# Patient Record
Sex: MALE | Race: OTHER | NOT HISPANIC OR LATINO | URBAN - METROPOLITAN AREA
[De-identification: names, ages, dates, MRNs, and addresses within clinical notes are randomized per-mention and may not be internally consistent; named-entity substitution may affect disease eponyms.]

---

## 2023-02-20 ENCOUNTER — INPATIENT (INPATIENT)
Facility: HOSPITAL | Age: 78
LOS: 6 days | Discharge: HOME | DRG: 233 | End: 2023-02-27
Attending: THORACIC SURGERY (CARDIOTHORACIC VASCULAR SURGERY) | Admitting: STUDENT IN AN ORGANIZED HEALTH CARE EDUCATION/TRAINING PROGRAM
Payer: MEDICARE

## 2023-02-20 ENCOUNTER — TRANSCRIPTION ENCOUNTER (OUTPATIENT)
Age: 78
End: 2023-02-20

## 2023-02-20 VITALS
TEMPERATURE: 98 F | RESPIRATION RATE: 20 BRPM | HEART RATE: 73 BPM | WEIGHT: 176.15 LBS | DIASTOLIC BLOOD PRESSURE: 69 MMHG | OXYGEN SATURATION: 98 % | HEIGHT: 74 IN | SYSTOLIC BLOOD PRESSURE: 115 MMHG

## 2023-02-20 DIAGNOSIS — I21.4 NON-ST ELEVATION (NSTEMI) MYOCARDIAL INFARCTION: ICD-10-CM

## 2023-02-20 DIAGNOSIS — I10 ESSENTIAL (PRIMARY) HYPERTENSION: ICD-10-CM

## 2023-02-20 DIAGNOSIS — E78.5 HYPERLIPIDEMIA, UNSPECIFIED: ICD-10-CM

## 2023-02-20 DIAGNOSIS — I24.9 ACUTE ISCHEMIC HEART DISEASE, UNSPECIFIED: ICD-10-CM

## 2023-02-20 LAB
A1C WITH ESTIMATED AVERAGE GLUCOSE RESULT: 7.2 % — HIGH (ref 4–5.6)
ABO RH CONFIRMATION: SIGNIFICANT CHANGE UP
ALBUMIN SERPL ELPH-MCNC: 4.1 G/DL — SIGNIFICANT CHANGE UP (ref 3.3–5.2)
ALBUMIN SERPL ELPH-MCNC: 4.1 G/DL — SIGNIFICANT CHANGE UP (ref 3.3–5.2)
ALP SERPL-CCNC: 77 U/L — SIGNIFICANT CHANGE UP (ref 40–120)
ALP SERPL-CCNC: 82 U/L — SIGNIFICANT CHANGE UP (ref 40–120)
ALT FLD-CCNC: 21 U/L — SIGNIFICANT CHANGE UP
ALT FLD-CCNC: 24 U/L — SIGNIFICANT CHANGE UP
ANION GAP SERPL CALC-SCNC: 13 MMOL/L — SIGNIFICANT CHANGE UP (ref 5–17)
ANION GAP SERPL CALC-SCNC: 14 MMOL/L — SIGNIFICANT CHANGE UP (ref 5–17)
APPEARANCE UR: CLEAR — SIGNIFICANT CHANGE UP
APTT BLD: 29.2 SEC — SIGNIFICANT CHANGE UP (ref 27.5–35.5)
APTT BLD: 46.6 SEC — HIGH (ref 27.5–35.5)
APTT BLD: 59.1 SEC — HIGH (ref 27.5–35.5)
AST SERPL-CCNC: 49 U/L — HIGH
AST SERPL-CCNC: 56 U/L — HIGH
BACTERIA # UR AUTO: ABNORMAL
BASOPHILS # BLD AUTO: 0.05 K/UL — SIGNIFICANT CHANGE UP (ref 0–0.2)
BASOPHILS # BLD AUTO: 0.06 K/UL — SIGNIFICANT CHANGE UP (ref 0–0.2)
BASOPHILS NFR BLD AUTO: 0.6 % — SIGNIFICANT CHANGE UP (ref 0–2)
BASOPHILS NFR BLD AUTO: 0.6 % — SIGNIFICANT CHANGE UP (ref 0–2)
BILIRUB SERPL-MCNC: 0.3 MG/DL — LOW (ref 0.4–2)
BILIRUB SERPL-MCNC: 0.4 MG/DL — SIGNIFICANT CHANGE UP (ref 0.4–2)
BILIRUB UR-MCNC: NEGATIVE — SIGNIFICANT CHANGE UP
BLD GP AB SCN SERPL QL: SIGNIFICANT CHANGE UP
BUN SERPL-MCNC: 17.7 MG/DL — SIGNIFICANT CHANGE UP (ref 8–20)
BUN SERPL-MCNC: 21.4 MG/DL — HIGH (ref 8–20)
CALCIUM SERPL-MCNC: 9.5 MG/DL — SIGNIFICANT CHANGE UP (ref 8.4–10.5)
CALCIUM SERPL-MCNC: 9.6 MG/DL — SIGNIFICANT CHANGE UP (ref 8.4–10.5)
CHLORIDE SERPL-SCNC: 101 MMOL/L — SIGNIFICANT CHANGE UP (ref 96–108)
CHLORIDE SERPL-SCNC: 98 MMOL/L — SIGNIFICANT CHANGE UP (ref 96–108)
CHOLEST SERPL-MCNC: 114 MG/DL — SIGNIFICANT CHANGE UP
CK MB CFR SERPL CALC: 10.7 NG/ML — HIGH (ref 0–6.7)
CK SERPL-CCNC: 149 U/L — SIGNIFICANT CHANGE UP (ref 30–200)
CK SERPL-CCNC: 152 U/L — SIGNIFICANT CHANGE UP (ref 30–200)
CO2 SERPL-SCNC: 22 MMOL/L — SIGNIFICANT CHANGE UP (ref 22–29)
CO2 SERPL-SCNC: 25 MMOL/L — SIGNIFICANT CHANGE UP (ref 22–29)
COLOR SPEC: YELLOW — SIGNIFICANT CHANGE UP
CREAT SERPL-MCNC: 0.84 MG/DL — SIGNIFICANT CHANGE UP (ref 0.5–1.3)
CREAT SERPL-MCNC: 1.08 MG/DL — SIGNIFICANT CHANGE UP (ref 0.5–1.3)
DIFF PNL FLD: NEGATIVE — SIGNIFICANT CHANGE UP
EGFR: 71 ML/MIN/1.73M2 — SIGNIFICANT CHANGE UP
EGFR: 90 ML/MIN/1.73M2 — SIGNIFICANT CHANGE UP
EOSINOPHIL # BLD AUTO: 0.18 K/UL — SIGNIFICANT CHANGE UP (ref 0–0.5)
EOSINOPHIL # BLD AUTO: 0.19 K/UL — SIGNIFICANT CHANGE UP (ref 0–0.5)
EOSINOPHIL NFR BLD AUTO: 1.9 % — SIGNIFICANT CHANGE UP (ref 0–6)
EOSINOPHIL NFR BLD AUTO: 2.2 % — SIGNIFICANT CHANGE UP (ref 0–6)
EPI CELLS # UR: SIGNIFICANT CHANGE UP
ESTIMATED AVERAGE GLUCOSE: 160 MG/DL — HIGH (ref 68–114)
GLUCOSE BLDC GLUCOMTR-MCNC: 144 MG/DL — HIGH (ref 70–99)
GLUCOSE SERPL-MCNC: 131 MG/DL — HIGH (ref 70–99)
GLUCOSE SERPL-MCNC: 246 MG/DL — HIGH (ref 70–99)
GLUCOSE UR QL: 100 MG/DL
HCT VFR BLD CALC: 41.8 % — SIGNIFICANT CHANGE UP (ref 39–50)
HCT VFR BLD CALC: 42.5 % — SIGNIFICANT CHANGE UP (ref 39–50)
HCT VFR BLD CALC: 42.7 % — SIGNIFICANT CHANGE UP (ref 39–50)
HDLC SERPL-MCNC: 39 MG/DL — LOW
HGB BLD-MCNC: 14.1 G/DL — SIGNIFICANT CHANGE UP (ref 13–17)
HGB BLD-MCNC: 14.1 G/DL — SIGNIFICANT CHANGE UP (ref 13–17)
HGB BLD-MCNC: 14.4 G/DL — SIGNIFICANT CHANGE UP (ref 13–17)
IMM GRANULOCYTES NFR BLD AUTO: 0.5 % — SIGNIFICANT CHANGE UP (ref 0–0.9)
IMM GRANULOCYTES NFR BLD AUTO: 0.5 % — SIGNIFICANT CHANGE UP (ref 0–0.9)
INR BLD: 1.02 RATIO — SIGNIFICANT CHANGE UP (ref 0.88–1.16)
INR BLD: 1.02 RATIO — SIGNIFICANT CHANGE UP (ref 0.88–1.16)
KETONES UR-MCNC: ABNORMAL
LEUKOCYTE ESTERASE UR-ACNC: ABNORMAL
LIPID PNL WITH DIRECT LDL SERPL: 48 MG/DL — SIGNIFICANT CHANGE UP
LYMPHOCYTES # BLD AUTO: 1.59 K/UL — SIGNIFICANT CHANGE UP (ref 1–3.3)
LYMPHOCYTES # BLD AUTO: 1.82 K/UL — SIGNIFICANT CHANGE UP (ref 1–3.3)
LYMPHOCYTES # BLD AUTO: 15.5 % — SIGNIFICANT CHANGE UP (ref 13–44)
LYMPHOCYTES # BLD AUTO: 22 % — SIGNIFICANT CHANGE UP (ref 13–44)
MCHC RBC-ENTMCNC: 29.5 PG — SIGNIFICANT CHANGE UP (ref 27–34)
MCHC RBC-ENTMCNC: 29.8 PG — SIGNIFICANT CHANGE UP (ref 27–34)
MCHC RBC-ENTMCNC: 30.4 PG — SIGNIFICANT CHANGE UP (ref 27–34)
MCHC RBC-ENTMCNC: 33 GM/DL — SIGNIFICANT CHANGE UP (ref 32–36)
MCHC RBC-ENTMCNC: 33.7 GM/DL — SIGNIFICANT CHANGE UP (ref 32–36)
MCHC RBC-ENTMCNC: 33.9 GM/DL — SIGNIFICANT CHANGE UP (ref 32–36)
MCV RBC AUTO: 88.4 FL — SIGNIFICANT CHANGE UP (ref 80–100)
MCV RBC AUTO: 89.3 FL — SIGNIFICANT CHANGE UP (ref 80–100)
MCV RBC AUTO: 89.7 FL — SIGNIFICANT CHANGE UP (ref 80–100)
MONOCYTES # BLD AUTO: 0.66 K/UL — SIGNIFICANT CHANGE UP (ref 0–0.9)
MONOCYTES # BLD AUTO: 0.86 K/UL — SIGNIFICANT CHANGE UP (ref 0–0.9)
MONOCYTES NFR BLD AUTO: 8 % — SIGNIFICANT CHANGE UP (ref 2–14)
MONOCYTES NFR BLD AUTO: 8.4 % — SIGNIFICANT CHANGE UP (ref 2–14)
NEUTROPHILS # BLD AUTO: 5.52 K/UL — SIGNIFICANT CHANGE UP (ref 1.8–7.4)
NEUTROPHILS # BLD AUTO: 7.52 K/UL — HIGH (ref 1.8–7.4)
NEUTROPHILS NFR BLD AUTO: 66.7 % — SIGNIFICANT CHANGE UP (ref 43–77)
NEUTROPHILS NFR BLD AUTO: 73.1 % — SIGNIFICANT CHANGE UP (ref 43–77)
NITRITE UR-MCNC: POSITIVE
NON HDL CHOLESTEROL: 75 MG/DL — SIGNIFICANT CHANGE UP
NT-PROBNP SERPL-SCNC: 1834 PG/ML — HIGH (ref 0–300)
PA ADP PRP-ACNC: 213 PRU — SIGNIFICANT CHANGE UP (ref 180–376)
PH UR: 5 — SIGNIFICANT CHANGE UP (ref 5–8)
PLATELET # BLD AUTO: 147 K/UL — LOW (ref 150–400)
PLATELET # BLD AUTO: 154 K/UL — SIGNIFICANT CHANGE UP (ref 150–400)
PLATELET # BLD AUTO: 166 K/UL — SIGNIFICANT CHANGE UP (ref 150–400)
POTASSIUM SERPL-MCNC: 4 MMOL/L — SIGNIFICANT CHANGE UP (ref 3.5–5.3)
POTASSIUM SERPL-MCNC: 4.3 MMOL/L — SIGNIFICANT CHANGE UP (ref 3.5–5.3)
POTASSIUM SERPL-SCNC: 4 MMOL/L — SIGNIFICANT CHANGE UP (ref 3.5–5.3)
POTASSIUM SERPL-SCNC: 4.3 MMOL/L — SIGNIFICANT CHANGE UP (ref 3.5–5.3)
PREALB SERPL-MCNC: 22 MG/DL — SIGNIFICANT CHANGE UP (ref 18–38)
PROT SERPL-MCNC: 6.7 G/DL — SIGNIFICANT CHANGE UP (ref 6.6–8.7)
PROT SERPL-MCNC: 6.8 G/DL — SIGNIFICANT CHANGE UP (ref 6.6–8.7)
PROT UR-MCNC: NEGATIVE — SIGNIFICANT CHANGE UP
PROTHROM AB SERPL-ACNC: 11.8 SEC — SIGNIFICANT CHANGE UP (ref 10.5–13.4)
PROTHROM AB SERPL-ACNC: 11.8 SEC — SIGNIFICANT CHANGE UP (ref 10.5–13.4)
RBC # BLD: 4.73 M/UL — SIGNIFICANT CHANGE UP (ref 4.2–5.8)
RBC # BLD: 4.74 M/UL — SIGNIFICANT CHANGE UP (ref 4.2–5.8)
RBC # BLD: 4.78 M/UL — SIGNIFICANT CHANGE UP (ref 4.2–5.8)
RBC # FLD: 13.1 % — SIGNIFICANT CHANGE UP (ref 10.3–14.5)
RBC # FLD: 13.2 % — SIGNIFICANT CHANGE UP (ref 10.3–14.5)
RBC # FLD: 13.2 % — SIGNIFICANT CHANGE UP (ref 10.3–14.5)
RBC CASTS # UR COMP ASSIST: NEGATIVE /HPF — SIGNIFICANT CHANGE UP (ref 0–4)
SARS-COV-2 RNA SPEC QL NAA+PROBE: SIGNIFICANT CHANGE UP
SODIUM SERPL-SCNC: 136 MMOL/L — SIGNIFICANT CHANGE UP (ref 135–145)
SODIUM SERPL-SCNC: 137 MMOL/L — SIGNIFICANT CHANGE UP (ref 135–145)
SP GR SPEC: 1.01 — SIGNIFICANT CHANGE UP (ref 1.01–1.02)
TRIGL SERPL-MCNC: 134 MG/DL — SIGNIFICANT CHANGE UP
TROPONIN T SERPL-MCNC: 1.39 NG/ML — HIGH (ref 0–0.06)
TROPONIN T SERPL-MCNC: 1.5 NG/ML — HIGH (ref 0–0.06)
TROPONIN T SERPL-MCNC: 1.66 NG/ML — HIGH (ref 0–0.06)
TSH SERPL-MCNC: 1.45 UIU/ML — SIGNIFICANT CHANGE UP (ref 0.27–4.2)
UROBILINOGEN FLD QL: NEGATIVE — SIGNIFICANT CHANGE UP
WBC # BLD: 10.27 K/UL — SIGNIFICANT CHANGE UP (ref 3.8–10.5)
WBC # BLD: 8.27 K/UL — SIGNIFICANT CHANGE UP (ref 3.8–10.5)
WBC # BLD: 9.71 K/UL — SIGNIFICANT CHANGE UP (ref 3.8–10.5)
WBC # FLD AUTO: 10.27 K/UL — SIGNIFICANT CHANGE UP (ref 3.8–10.5)
WBC # FLD AUTO: 8.27 K/UL — SIGNIFICANT CHANGE UP (ref 3.8–10.5)
WBC # FLD AUTO: 9.71 K/UL — SIGNIFICANT CHANGE UP (ref 3.8–10.5)
WBC UR QL: SIGNIFICANT CHANGE UP /HPF (ref 0–5)

## 2023-02-20 PROCEDURE — 71045 X-RAY EXAM CHEST 1 VIEW: CPT | Mod: 26

## 2023-02-20 PROCEDURE — 93306 TTE W/DOPPLER COMPLETE: CPT | Mod: 26

## 2023-02-20 PROCEDURE — 0042T: CPT | Mod: MA

## 2023-02-20 PROCEDURE — 99223 1ST HOSP IP/OBS HIGH 75: CPT

## 2023-02-20 PROCEDURE — 99291 CRITICAL CARE FIRST HOUR: CPT

## 2023-02-20 PROCEDURE — 93880 EXTRACRANIAL BILAT STUDY: CPT | Mod: 26

## 2023-02-20 PROCEDURE — 70498 CT ANGIOGRAPHY NECK: CPT | Mod: 26,MA

## 2023-02-20 PROCEDURE — 93010 ELECTROCARDIOGRAM REPORT: CPT

## 2023-02-20 PROCEDURE — 70496 CT ANGIOGRAPHY HEAD: CPT | Mod: 26,MA

## 2023-02-20 RX ORDER — LANOLIN ALCOHOL/MO/W.PET/CERES
5 CREAM (GRAM) TOPICAL AT BEDTIME
Refills: 0 | Status: DISCONTINUED | OUTPATIENT
Start: 2023-02-20 | End: 2023-02-21

## 2023-02-20 RX ORDER — CHLORHEXIDINE GLUCONATE 213 G/1000ML
1 SOLUTION TOPICAL DAILY
Refills: 0 | Status: DISCONTINUED | OUTPATIENT
Start: 2023-02-20 | End: 2023-02-21

## 2023-02-20 RX ORDER — ATORVASTATIN CALCIUM 80 MG/1
40 TABLET, FILM COATED ORAL AT BEDTIME
Refills: 0 | Status: DISCONTINUED | OUTPATIENT
Start: 2023-02-20 | End: 2023-02-21

## 2023-02-20 RX ORDER — ASPIRIN/CALCIUM CARB/MAGNESIUM 324 MG
325 TABLET ORAL ONCE
Refills: 0 | Status: COMPLETED | OUTPATIENT
Start: 2023-02-20 | End: 2023-02-20

## 2023-02-20 RX ORDER — DEXTROSE 50 % IN WATER 50 %
25 SYRINGE (ML) INTRAVENOUS ONCE
Refills: 0 | Status: DISCONTINUED | OUTPATIENT
Start: 2023-02-20 | End: 2023-02-21

## 2023-02-20 RX ORDER — METOPROLOL TARTRATE 50 MG
25 TABLET ORAL EVERY 12 HOURS
Refills: 0 | Status: DISCONTINUED | OUTPATIENT
Start: 2023-02-20 | End: 2023-02-20

## 2023-02-20 RX ORDER — SODIUM CHLORIDE 9 MG/ML
3 INJECTION INTRAMUSCULAR; INTRAVENOUS; SUBCUTANEOUS EVERY 8 HOURS
Refills: 0 | Status: DISCONTINUED | OUTPATIENT
Start: 2023-02-20 | End: 2023-02-21

## 2023-02-20 RX ORDER — ASPIRIN/CALCIUM CARB/MAGNESIUM 324 MG
81 TABLET ORAL DAILY
Refills: 0 | Status: DISCONTINUED | OUTPATIENT
Start: 2023-02-20 | End: 2023-02-21

## 2023-02-20 RX ORDER — INSULIN LISPRO 100/ML
VIAL (ML) SUBCUTANEOUS
Refills: 0 | Status: DISCONTINUED | OUTPATIENT
Start: 2023-02-20 | End: 2023-02-21

## 2023-02-20 RX ORDER — HEPARIN SODIUM 5000 [USP'U]/ML
4700 INJECTION INTRAVENOUS; SUBCUTANEOUS ONCE
Refills: 0 | Status: COMPLETED | OUTPATIENT
Start: 2023-02-20 | End: 2023-02-20

## 2023-02-20 RX ORDER — CEFUROXIME AXETIL 250 MG
1500 TABLET ORAL ONCE
Refills: 0 | Status: DISCONTINUED | OUTPATIENT
Start: 2023-02-21 | End: 2023-02-21

## 2023-02-20 RX ORDER — DEXTROSE 50 % IN WATER 50 %
15 SYRINGE (ML) INTRAVENOUS ONCE
Refills: 0 | Status: DISCONTINUED | OUTPATIENT
Start: 2023-02-20 | End: 2023-02-21

## 2023-02-20 RX ORDER — GLUCAGON INJECTION, SOLUTION 0.5 MG/.1ML
1 INJECTION, SOLUTION SUBCUTANEOUS ONCE
Refills: 0 | Status: DISCONTINUED | OUTPATIENT
Start: 2023-02-20 | End: 2023-02-21

## 2023-02-20 RX ORDER — PANTOPRAZOLE SODIUM 20 MG/1
40 TABLET, DELAYED RELEASE ORAL
Refills: 0 | Status: DISCONTINUED | OUTPATIENT
Start: 2023-02-20 | End: 2023-02-21

## 2023-02-20 RX ORDER — SODIUM CHLORIDE 9 MG/ML
1000 INJECTION, SOLUTION INTRAVENOUS
Refills: 0 | Status: DISCONTINUED | OUTPATIENT
Start: 2023-02-20 | End: 2023-02-20

## 2023-02-20 RX ORDER — VANCOMYCIN HCL 1 G
1250 VIAL (EA) INTRAVENOUS ONCE
Refills: 0 | Status: DISCONTINUED | OUTPATIENT
Start: 2023-02-21 | End: 2023-02-21

## 2023-02-20 RX ORDER — HEPARIN SODIUM 5000 [USP'U]/ML
4700 INJECTION INTRAVENOUS; SUBCUTANEOUS EVERY 6 HOURS
Refills: 0 | Status: DISCONTINUED | OUTPATIENT
Start: 2023-02-20 | End: 2023-02-20

## 2023-02-20 RX ORDER — CHLORHEXIDINE GLUCONATE 213 G/1000ML
15 SOLUTION TOPICAL
Refills: 0 | Status: DISCONTINUED | OUTPATIENT
Start: 2023-02-20 | End: 2023-02-21

## 2023-02-20 RX ORDER — ALOGLIPTIN 12.5 MG/1
1 TABLET, FILM COATED ORAL
Qty: 0 | Refills: 0 | DISCHARGE

## 2023-02-20 RX ORDER — CLOPIDOGREL BISULFATE 75 MG/1
300 TABLET, FILM COATED ORAL ONCE
Refills: 0 | Status: COMPLETED | OUTPATIENT
Start: 2023-02-20 | End: 2023-02-20

## 2023-02-20 RX ORDER — METOPROLOL TARTRATE 50 MG
12.5 TABLET ORAL
Refills: 0 | Status: DISCONTINUED | OUTPATIENT
Start: 2023-02-21 | End: 2023-02-21

## 2023-02-20 RX ORDER — DEXTROSE 50 % IN WATER 50 %
12.5 SYRINGE (ML) INTRAVENOUS ONCE
Refills: 0 | Status: DISCONTINUED | OUTPATIENT
Start: 2023-02-20 | End: 2023-02-21

## 2023-02-20 RX ORDER — HEPARIN SODIUM 5000 [USP'U]/ML
INJECTION INTRAVENOUS; SUBCUTANEOUS
Qty: 25000 | Refills: 0 | Status: DISCONTINUED | OUTPATIENT
Start: 2023-02-20 | End: 2023-02-21

## 2023-02-20 RX ORDER — NITROGLYCERIN 6.5 MG
0.4 CAPSULE, EXTENDED RELEASE ORAL ONCE
Refills: 0 | Status: COMPLETED | OUTPATIENT
Start: 2023-02-20 | End: 2023-02-20

## 2023-02-20 RX ORDER — METFORMIN HYDROCHLORIDE 850 MG/1
2 TABLET ORAL
Qty: 0 | Refills: 0 | DISCHARGE

## 2023-02-20 RX ORDER — DULOXETINE HYDROCHLORIDE 30 MG/1
1 CAPSULE, DELAYED RELEASE ORAL
Qty: 0 | Refills: 0 | DISCHARGE

## 2023-02-20 RX ADMIN — SODIUM CHLORIDE 3 MILLILITER(S): 9 INJECTION INTRAMUSCULAR; INTRAVENOUS; SUBCUTANEOUS at 21:50

## 2023-02-20 RX ADMIN — Medication 5 MILLIGRAM(S): at 22:34

## 2023-02-20 RX ADMIN — Medication 0.4 MILLIGRAM(S): at 15:09

## 2023-02-20 RX ADMIN — PANTOPRAZOLE SODIUM 40 MILLIGRAM(S): 20 TABLET, DELAYED RELEASE ORAL at 18:40

## 2023-02-20 RX ADMIN — Medication 25 MILLIGRAM(S): at 18:40

## 2023-02-20 RX ADMIN — Medication 325 MILLIGRAM(S): at 14:20

## 2023-02-20 RX ADMIN — ATORVASTATIN CALCIUM 40 MILLIGRAM(S): 80 TABLET, FILM COATED ORAL at 22:35

## 2023-02-20 RX ADMIN — HEPARIN SODIUM 950 UNIT(S)/HR: 5000 INJECTION INTRAVENOUS; SUBCUTANEOUS at 18:47

## 2023-02-20 RX ADMIN — HEPARIN SODIUM 1100 UNIT(S)/HR: 5000 INJECTION INTRAVENOUS; SUBCUTANEOUS at 22:36

## 2023-02-20 RX ADMIN — CLOPIDOGREL BISULFATE 300 MILLIGRAM(S): 75 TABLET, FILM COATED ORAL at 15:09

## 2023-02-20 RX ADMIN — HEPARIN SODIUM 4700 UNIT(S): 5000 INJECTION INTRAVENOUS; SUBCUTANEOUS at 15:09

## 2023-02-20 RX ADMIN — HEPARIN SODIUM 950 UNIT(S)/HR: 5000 INJECTION INTRAVENOUS; SUBCUTANEOUS at 15:08

## 2023-02-20 NOTE — CONSULT NOTE ADULT - ASSESSMENT
elevated tropnin    repeat trop with ck  heparin gtt  stat ECHO  plavix  asa given  nitro sublingual     pt confortable in stretcher.  77 /o M HTN HLD DM presented to ED with c/o L arm numbness, abnormal movement, code stroke called in ED, CT head neg for acute infarct. Labs significant for elevated troponin 1.5. Pt endorses slight constant chest pressure. States over past month have noticed the pressure more when walking dog. EKG concerning for anterior infarct.    Elevated Troponin   repeat trop with ck  heparin gtt ordered  stat ECHO- prelim EF 30%, possible LV thrombus, apex hypokenetic   plavix 300mg given  asa given  nitro sublingual   lipid panel, a1c in am    Arm numbness  neurology-code stroke called   head ct neg for acute infarct, no hemorrhage      77 /o M HTN HLD DM presented to ED with c/o L arm numbness, abnormal movement, code stroke called in ED, CT head neg for acute infarct. Labs significant for elevated troponin 1.5. Pt endorses slight constant chest pressure. States over past month have noticed the pressure more when walking dog. EKG concerning for anterior infarct.    Elevated Troponin/ACS  repeat trop with ck  heparin gtt ordered  stat ECHO- prelim EF 30%, possible LV thrombus, apex hypokenetic   plavix 300mg given  asa given  nitro sublingual   lipid panel, a1c in am  urgent cath today- discussed with Dr. estrada    Arm numbness  neurology-code stroke called   head ct neg for acute infarct, no hemorrhage

## 2023-02-20 NOTE — CONSULT NOTE ADULT - SUBJECTIVE AND OBJECTIVE BOX
[facility]                                              CARDIOLOGY AT [place] [address]                                             Telephone: [phone]. Fax:[phone]                                                       CARDIOLOGY CONSULTATION NOTE                                                                                             History obtained by: Patient and medical record  Community Cardiologist: none   obtained: Yes [  ] No [  ]  Reason for Consultation:   Available out pt records reviewed: Yes [  ] No [  ]    Chief complaint:    Patient is a 77y old  Male who presents with a chief complaint of     HPI: 77 /o M HTN HLD DM presented to ED with c/o L arm numbness, abnormal movement, code stroke called in ED, CT head neg for acute infarct. Labs significant for elevated troponin 1.5. Pt endorses slight constant chest pressure. States over past month have noticed the pressure more when walking dog. EKG concerning for posterior infarct.       CARDIAC TESTING   ECHO:    STRESS:    CATH:     ELECTROPHYSIOLOGY:     PAST MEDICAL HISTORY  HTN (hypertension)    HLD (hyperlipidemia)    Post traumatic stress disorder (PTSD)        PAST SURGICAL HISTORY      SOCIAL HISTORY:  Denies smoking/alcohol/drugs  CIGARETTES:     ALCOHOL:  DRUGS:    FAMILY HISTORY:    Family History of Cardiovascular Disease:  Yes [  ] No [  ]  Coronary Artery Disease in first degree relative: Yes [  ] No [  ]  Sudden Cardiac Death in First degree relative: Yes [  ] No [  ]    HOME MEDICATIONS:      CURRENT CARDIAC MEDICATIONS:  nitroglycerin     SubLingual 0.4 milliGRAM(s) SubLingual once, Stop order after: 1 Doses      CURRENT OTHER MEDICATIONS:  clopidogrel Tablet 300 milliGRAM(s) Oral once, Stop order after: 1 Doses  heparin   Injectable 4700 Unit(s) IV Push once, Stop order after: 1 Doses  heparin   Injectable 4700 Unit(s) IV Push every 6 hours PRN For aPTT less than 40  heparin  Infusion.  Unit(s)/Hr (9.5 mL/Hr) IV Continuous <Continuous>      ALLERGIES:   No Known Allergies      REVIEW OF SYMPTOMS:   CONSTITUTIONAL: No fever, no chills, no weight loss, no weight gain, no fatigue   ENMT:  No vertigo; No sinus or throat pain  NECK: No pain or stiffness  CARDIOVASCULAR: No chest pain, no dyspnea, no syncope/presyncope, no palpitations, no dizziness, no Orthopnea, no Paroxsymal nocturnal dyspnea  RESPIRATORY: no Shortness of breath, no cough, no wheezing  : No dysuria, no hematuria   GI: No dark color stool, no nausea, no diarrhea, no constipation, no abdominal pain   NEURO: No headache, no slurred speech   MUSCULOSKELETAL: No joint pain or swelling; No muscle, back, or extremity pain  PSYCH: No agitation, no anxiety.    ALL OTHER REVIEW OF SYSTEMS ARE NEGATIVE.        VITAL SIGNS:  T(C): 36.7 (02-20-23 @ 12:14), Max: 36.7 (02-20-23 @ 12:14)  T(F): 98.1 (02-20-23 @ 12:14), Max: 98.1 (02-20-23 @ 12:14)  HR: 73 (02-20-23 @ 12:14) (73 - 73)  BP: 115/69 (02-20-23 @ 12:14) (115/69 - 115/69)  RR: 20 (02-20-23 @ 12:14) (20 - 20)  SpO2: 98% (02-20-23 @ 12:14) (98% - 98%)        PHYSICAL EXAM:  Constitutional: Comfortable . No acute distress.   HEENT: Atraumatic and normocephalic , neck is supple . no JVD. No carotid bruit.  CNS: A&Ox3. No focal deficits.   Respiratory: CTAB, unlabored   Cardiovascular: RRR normal s1 s2. No murmur. No rubs or gallop.  Gastrointestinal: Soft, non-tender. +Bowel sounds.   Extremities: 2+ Peripheral Pulses, No clubbing, cyanosis, or edema  Psychiatric: Calm . no agitation.   Skin: Warm and dry, no ulcers on extremities         LABS:  ( 20 Feb 2023 12:27 )  Troponin T  1.50<H>,  CPK  X    , CKMB  X    , BNP X                     14.1   10.27 )-----------( 166      ( 20 Feb 2023 12:27 )             42.7     02-20    136  |  98  |  21.4<H>  ----------------------------<  246<H>  4.3   |  25.0  |  1.08    Ca    9.6      20 Feb 2023 12:27    TPro  6.8  /  Alb  4.1  /  TBili  0.3<L>  /  DBili  x   /  AST  56<H>  /  ALT  24  /  AlkPhos  77  02-20    PT/INR - ( 20 Feb 2023 12:27 )   PT: 11.8 sec;   INR: 1.02 ratio         PTT - ( 20 Feb 2023 12:27 )  PTT:29.2 sec      INTERPRETATION OF TELEMETRY:     ECG:   Prior ECG: Yes [  ] No [  ]    RADIOLOGY & ADDITIONAL STUDIES:    X-ray:    CT scan:   MRI:   US:                                                [facility]                                              CARDIOLOGY AT [place] [address]                                             Telephone: [phone]. Fax:[phone]                                                       CARDIOLOGY CONSULTATION NOTE                                                                                             History obtained by: Patient and medical record  Community Cardiologist: none   obtained: Yes [  ] No [  ]  Reason for Consultation:   Available out pt records reviewed: Yes [  ] No [  ]    Chief complaint:    Patient is a 77y old  Male who presents with a chief complaint of     HPI: 77 /o M HTN HLD DM presented to ED with c/o L arm numbness, abnormal movement, code stroke called in ED, CT head neg for acute infarct. Labs significant for elevated troponin 1.5. Pt endorses slight constant chest pressure. States over past month have noticed the pressure more when walking dog. EKG concerning for anterior infarct.      CARDIAC TESTING   ECHO:    STRESS:    CATH:     ELECTROPHYSIOLOGY:       PAST MEDICAL HISTORY  HTN (hypertension)  HLD (hyperlipidemia)  Post traumatic stress disorder (PTSD)        PAST SURGICAL HISTORY      SOCIAL HISTORY:  Denies smoking/alcohol/drugs; lives with wife, [place] [employer]et  CIGARETTES:  non smoker   ALCOHOL:social   DRUGS: denies     FAMILY HISTORY:  Father MI 61 y/o      HOME MEDICATIONS:      CURRENT CARDIAC MEDICATIONS:  nitroglycerin   SubLingual 0.4 milliGRAM(s) SubLingual once, Stop order after: 1 Doses      CURRENT OTHER MEDICATIONS:  clopidogrel Tablet 300 milliGRAM(s) Oral once, Stop order after: 1 Doses  heparin   Injectable 4700 Unit(s) IV Push once, Stop order after: 1 Doses  heparin   Injectable 4700 Unit(s) IV Push every 6 hours PRN For aPTT less than 40  heparin  Infusion.  Unit(s)/Hr (9.5 mL/Hr) IV Continuous <Continuous>      ALLERGIES:   No Known Allergies      REVIEW OF SYMPTOMS:   CONSTITUTIONAL: No fever, no chills, no weight loss, no weight gain, no fatigue   ENMT:  No vertigo; No sinus or throat pain  NECK: No pain or stiffness  CARDIOVASCULAR: + chest pain, no dyspnea, no syncope/presyncope, no palpitations, no dizziness, no Orthopnea, no Paroxsymal nocturnal dyspnea  RESPIRATORY: no Shortness of breath, no cough, no wheezing  : No dysuria, no hematuria   GI: No dark color stool, no nausea, no diarrhea, no constipation, no abdominal pain   NEURO: No headache, no slurred speech   MUSCULOSKELETAL: No joint pain or swelling; No muscle, back, or extremity pain  PSYCH: No agitation, no anxiety.    ALL OTHER REVIEW OF SYSTEMS ARE NEGATIVE.        VITAL SIGNS:  T(C): 36.7 (02-20-23 @ 12:14), Max: 36.7 (02-20-23 @ 12:14)  T(F): 98.1 (02-20-23 @ 12:14), Max: 98.1 (02-20-23 @ 12:14)  HR: 73 (02-20-23 @ 12:14) (73 - 73)  BP: 115/69 (02-20-23 @ 12:14) (115/69 - 115/69)  RR: 20 (02-20-23 @ 12:14) (20 - 20)  SpO2: 98% (02-20-23 @ 12:14) (98% - 98%)        PHYSICAL EXAM:  Constitutional: Comfortable . No acute distress.   HEENT: Atraumatic and normocephalic , neck is supple . no JVD.   CNS: A&Ox3. No focal deficits.   Respiratory: CTAB, unlabored   Cardiovascular: RRR normal s1 s2. No murmur.   Gastrointestinal: Soft, non-tender. +Bowel sounds.   Extremities: No edema  Psychiatric: Calm . no agitation.   Skin: Warm and dry, no ulcers on extremities. ring finger Left hand amputated         LABS:  ( 20 Feb 2023 12:27 )  Troponin T  1.50<H>,  CPK  X    , CKMB  X    , BNP X                     14.1   10.27 )-----------( 166      ( 20 Feb 2023 12:27 )             42.7     02-20    136  |  98  |  21.4<H>  ----------------------------<  246<H>  4.3   |  25.0  |  1.08    Ca    9.6      20 Feb 2023 12:27    TPro  6.8  /  Alb  4.1  /  TBili  0.3<L>  /  DBili  x   /  AST  56<H>  /  ALT  24  /  AlkPhos  77  02-20    PT/INR - ( 20 Feb 2023 12:27 )   PT: 11.8 sec;   INR: 1.02 ratio         PTT - ( 20 Feb 2023 12:27 )  PTT:29.2 sec      INTERPRETATION OF TELEMETRY: SR      ECG: SR, anterior lateral injury     < from: CT Angio Neck Stroke Protocol w/ IV Cont (02.20.23 @ 12:42) >      IMPRESSION:    CT PERFUSION demonstrated: No core infarct. No active ischemia.  If symptoms persist consider follow up head CT or MRI, MRA  if no   contraindication.    CTA COW:  Mild focal stenosis in the left P2 segment of left PCA.   Bilateral persistent fetal origin of PCAs. Hypoplastic P1 segments.    CTA NECK: Less than 50% stenosis at the left ICA origin by NASCET   criteria.  Bilateral vertebral arteries are patent without flow limiting stenosis.    Discussed with Dr. Ramirez in the ED at 1:15 PM.    --- End of Report ---            CLOVIS KIRBY MD; Attending Radiologist  This document has been electronically signed. Feb 20 2023  1:20PM    < end of copied text >

## 2023-02-20 NOTE — CONSULT NOTE ADULT - PROBLEM SELECTOR RECOMMENDATION 2
-IABP on 1:1   - Aggressive medical management and risk factor modification   - ASA  - Preop   -P2y12 in AM  - [facility] Cardiology to follow

## 2023-02-20 NOTE — H&P ADULT - NSHPPHYSICALEXAM_GEN_ALL_CORE
General: sitting on the bed, NAD   Head and neck: normocephalic, no JVD   Cardio: regular rate and rhythm   Pulm: clear breath sounds   GI: abdomen is soft, BS (+)   Extr; no edema   Neuro: AOx3, no focal weakness   ENT: normal

## 2023-02-20 NOTE — ED PROVIDER NOTE - OBJECTIVE STATEMENT
77y Male with history of HTN, HLD, DM presenting with left arm weakness and decreased sensation that started 8PM last night with no traumatic injury, chest pain. Denies fevers, chills, headache, chest pain, palpitations, shortness of breath, cough, nausea, vomiting, diarrhea, hematuria, dysuria, dark stools.

## 2023-02-20 NOTE — ED ADULT NURSE NOTE - OBJECTIVE STATEMENT
pt alert and oriented x4 comes in c/o left arm spasms to left arm since 8pm last night. after laying on stomach with arms bent. pt able to lift right arm but keeps moving without his control. strength equal. respirations even unlabored.

## 2023-02-20 NOTE — ED PROVIDER NOTE - PROGRESS NOTE DETAILS
stable on reassessment with persistent left UE weakness. CT reviewed with no acute pathology noted. stable on reassessment with persistent left UE weakness. CT reviewed with no acute pathology noted. troponin elevated and patient now reports intermittent exertional chest pain. cardiology consulted for further evaluation. -DO Nneka

## 2023-02-20 NOTE — ED PROVIDER NOTE - CARE PLAN
1 Principal Discharge DX:	NSTEMI (non-ST elevation myocardial infarction)   Principal Discharge DX:	NSTEMI (non-ST elevation myocardial infarction)  Secondary Diagnosis:	Neuropathy of left radial nerve

## 2023-02-20 NOTE — ED PROVIDER NOTE - NSICDXPASTMEDICALHX_GEN_ALL_CORE_FT
PAST MEDICAL HISTORY:  HLD (hyperlipidemia)     HTN (hypertension)     Post traumatic stress disorder (PTSD)

## 2023-02-20 NOTE — CONSULT NOTE ADULT - ASSESSMENT
Assessment:  77y Male presents with NSTEMI (non-ST elevation myocardial infarction) and upon cardiac catheterization found to have Severe LM and 4 vessel CAD. Cardiogenic shock with IABP placement. Hemodynamically stable in CT ICU. Neurology assessment appreciated. Clearance obtained for cardiac surgery.

## 2023-02-20 NOTE — ED PROVIDER NOTE - PHYSICAL EXAMINATION
General: Well appearing in no acute distress. Alert and cooperative.   Head: Normocephalic, atraumatic.  Eyes: PERRLA. No conjunctival injection. No scleral icterus. EOMI  ENMT: Atraumatic external nose and ears. Moist mucous membranes. Oropharynx clear.  Neck: Soft and supple. Full ROM without pain. No midline tenderness. No Thyromegaly. No lymphadenopathy.  Cardiac: Regular rate and regular rhythm. No murmurs. Peripheral pulses 2+ and symmetric in all extremities.   Resp: Unlabored respiratory effort. Lungs CTAB.   Abd: Soft, non-tender, non-distended.   MSK: Spine midline and non-tender.   Skin: Warm and dry.   Neuro: AO x 3. Moves all extremities symmetrically. 4/5 strength LUE with slight decreased sensation C8 dermatome     Motor strength and sensation grossly intact.
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
## 2023-02-20 NOTE — H&P ADULT - HISTORY OF PRESENT ILLNESS
78 yo male with PMHs of HTN,  HLD,  DM presented to ED with c/o L arm numbness, abnormal movement since this morning. In ED CODE Stroke called. CT head neg for acute infarct.   Labs significant for elevated troponin 1.5 and abnormal EKG. Pt reported c/o chest pressure on exertion for the past couple of weeks.   He denies any headache, fever, dizziness, SOB, palpitations, nausea, vomiting, abdominal pain, change in urinary or bowel habits.     During my encounter, pt had no chest pain and was on his way to cath lab for cardiac cauterization

## 2023-02-20 NOTE — CONSULT NOTE ADULT - NSCONSULTADDITIONALINFOA_GEN_ALL_CORE
Addendum:  Pt taken to cardiac Cath lab.   LM 95%, multi vessel disease LAD with collaterals to R, Cx and RCA  IABP being place. Pt to be transferred to CTSX service
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
## 2023-02-20 NOTE — H&P ADULT - NS PANP COMMENT GEN_ALL_CORE FT
Patient seen and examined AM of 2/21/23  Pre-OP work up reviewed  Consent obtained   OR today for CABG

## 2023-02-20 NOTE — H&P ADULT - ASSESSMENT
76 yo male with PMHs of HTN,  HLD,  DM presented to ED with c/o L arm numbness, abnormal movement since this morning. In ED CODE Stroke called. CT head neg for acute infarct.   Labs significant for elevated troponin 1.5 and abnormal EKG. Pt reported c/o chest pressure on exertion for the past couple of weeks.   He denies any headache, fever, dizziness, SOB, palpitations, nausea, vomiting, abdominal pain, change in urinary or bowel habits.       Acute coronary syndrome   Admit to telemetry   Going for cardiac cauterization  Heparin infusion started in ED   Further management post cath   Follow Lipid panel and TSH, HbA1C     Type 2 DM   HOld home meds   Follow HbA1C, monitor BG   Start Insulin admelog per ss for now     HTN  c/w Lisinopril     Addendum: s/p cardiac cath, found to have multi vessel disease. Plan to transfer to CT surgery service., Discussed with cardiology team

## 2023-02-20 NOTE — ED ADULT TRIAGE NOTE - HEIGHT IN CM
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; induction course c/b neutropenic fevers due to C.diff colitis, VSE (faecium) septicemia, and Fusarium fungemia; not on ART or therapy at home for these pathogens after leaving [place] 2/24) who was admitted on 3/14/2017 with chills/sweats, malaise, weakness, MÉNDEZ, abdominal pain, diarrhea, and left hand pain and was found to have neutropenic fever/septic shock from Klebsiella septicemia, likely persistent CDI, untreated fusariosis (of note, 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 He has stabilized some on empiric zosyn, PO vanc/IV flagyl, ambisome, and acyclovir but his overall long-term prognosis is terminal with high-risk AML, AIDS, active mold/bacterial infections, and medical noncompliance. End of life discussions have begun appropriately.    - would transition zosyn to oral cipro as his Klebsiella isolate is sensitive (will also likely improve comfort)  - would continue PO vanc/IV flagyl for CDI for now -- as diarrhea improves, can taper off flagyl  - would continue ambisome for disseminated Fusariosis (likely reflects the pulmonary nodules and hepatosplenic lesions seen on his recent imaging)  - may continue ACV prophylaxis to prevent HSV flares  - would hold ART as will not impact his survival  - would hold on additional interventions  while arrangements for inpatient hospice are made -- patient remains DNI/DNR  - greatly appreciate assistance of palliative care team in arranging family care/hospice for patient -- will continue to follow from a distance and help taper his medications for them as this transition occurs; I am available on my cell phone if they would like for me to speak further with his family when they are present for a family meeting      Anticipated Disposition: pending hospice arrangements    Thank you for your consult. I will follow-up with patient. Please contact us if you have any additional questions.     Mae Mike MD  Transplant ID Attending  [phone]    Mae Mike MD  Infectious Disease  [facility]    Subjective:     Principal Problem:Sepsis due to Gram-negative organism with septic shock    HPI:   Interval History: Patient reserved today. No new complaints. Fever curve improving. Klebsiella sensitive to cipro.       Review of Systems   Constitutional: Positive for activity change, appetite change, fatigue and unexpected weight change. Negative for chills, diaphoresis and fever.   HENT: Negative for dental problem, drooling, ear pain, mouth sores, postnasal drip, rhinorrhea, sinus pressure, sore throat and trouble swallowing.    Eyes: Negative for photophobia, pain and redness.   Respiratory: Positive for shortness of breath. Negative for cough and wheezing.    Cardiovascular: Negative for chest pain and leg swelling.   Gastrointestinal: Positive for abdominal pain. Negative for abdominal distention, diarrhea and nausea.   Genitourinary: Negative for dysuria, flank pain, frequency and urgency.   Musculoskeletal: Positive for arthralgias. Negative for back pain, gait problem and myalgias.   Skin: Negative for pallor and rash.   Neurological: Negative for dizziness, tremors, seizures, weakness and headaches.   Hematological: Bruises/bleeds easily.   Psychiatric/Behavioral: Negative for  confusion.     Objective:     Vital Signs (Most Recent):  Temp: 98.3 °F (36.8 °C) (03/17/17 1100)  Pulse: (!) 125 (03/17/17 1502)  Resp: (!) 35 (03/17/17 1502)  BP: (!) 97/56 (03/17/17 1502)  SpO2: 97 % (03/17/17 1502) Vital Signs (24h Range):  Temp:  [97.8 °F (36.6 °C)-98.4 °F (36.9 °C)] 98.3 °F (36.8 °C)  Pulse:  [104-130] 125  Resp:  [20-36] 35  SpO2:  [97 %-100 %] 97 %  BP: (75-99)/(44-67) 97/56     Weight: 35.8 kg (78 lb 14.8 oz)  Body mass index is 13.98 kg/(m^2).    Estimated Creatinine Clearance: 126.8 mL/min (based on Cr of 0.4).    Physical Exam   Constitutional: No distress.   Markedly cachectic.   HENT:   Head: Atraumatic.   Mouth/Throat: Oropharynx is clear and moist. No oropharyngeal exudate.   Eyes: Conjunctivae and EOM are normal. Pupils are equal, round, and reactive to light. No scleral icterus.   Neck: Neck supple.   Cardiovascular: Normal rate and regular rhythm.  Exam reveals no friction rub.    Murmur heard.  Pulmonary/Chest: No respiratory distress. He has no wheezes. He has rales. He exhibits no tenderness.   Abdominal: Soft. Bowel sounds are normal. He exhibits no distension. There is no tenderness. There is no rebound and no guarding.   Musculoskeletal: Normal range of motion. He exhibits no edema.   Lymphadenopathy:     He has no cervical adenopathy.   Neurological: He is alert. No cranial nerve deficit. He exhibits normal muscle tone. Coordination normal.   Skin: No rash noted. No erythema.       Significant Labs:   CBC:     Recent Labs  Lab 03/15/17  1726 03/16/17  0442 03/17/17  0326   WBC 2.89* 3.05* 2.16*   HGB 7.7* 8.4* 7.8*   HCT 22.6* 24.2* 22.8*   PLT 22* 26* 16*     CMP:   Recent Labs  Lab 03/16/17  0442  03/16/17  1712 03/17/17  0326 03/17/17  0831     < > 140  140 142 141   K 3.9  < > 4.3  4.3 3.1* 3.3*   *  < > 112*  112* 113* 112*   CO2 20*  < > 19*  19* 18* 19*   *  < > 101  101 65* 77   BUN 11  < > 11  11 13 13   CREATININE 0.5  < > 0.5  0.5 0.4*  0.4*   CALCIUM 7.5*  < > 7.7*  7.7* 7.6* 7.8*   PROT 5.1*  --   --  4.8*  --    ALBUMIN 1.3*  --   --  1.3*  --    BILITOT 3.9*  --   --  4.6*  --    ALKPHOS 164*  --   --  131  --    *  --   --  49*  --    *  --   --  112*  --    ANIONGAP 9  < > 9  9 11 10   EGFRNONAA >60.0  < > >60.0  >60.0 >60.0 >60.0   < > = values in this interval not displayed.    Significant Imaging: I have reviewed all pertinent imaging results/findings within the past 24 hours.     Abdominal U/S:  Nondistended gallbladder demonstrating increased wall thickness of 0.8 cm.  No cholelithiasis. The gallbladder wall thickening may be secondary to nondistention. However, it can also be seen in the setting of hepatitis.  Correlation advised.      Left hand XR:  There is medullary expansion of the phalanges, nonspecific.   There is lytic destruction involving the distal phalanx of the fourth digit and the base of the distal phalanx of the third digit with associated joint space narrowing at the DIP joints. There is soft tissue swelling in the fourth digit. Findings suspicious for osteomyelitis involving the distal phalanges of both digits.     CXR: Impression persistent patchy infiltrate in the right upper and midlung field.     Microbiology:  3/14 blood cx: Klebsiella x2  3/14 urine cx: negative  3/14 sputum cx: NGTD  3/14 SCrAg pending  3/14 blood cx: Klebsiella x2  3/15 stool cx: NGTD  3/15 C.diff testing discordant, PCR positive  3/15 Shiga toxin negative  3/15 O&P negative  3/15 fungal blood cx: NGTD  3/16 blood cx: NGTD     187.96

## 2023-02-20 NOTE — CONSULT NOTE ADULT - NS ATTEND AMEND GEN_ALL_CORE FT
77 /o M HTN HLD DM presented to ED with c/o L arm numbness, abnormal movement, code stroke called in ED, CT head neg for acute infarct. Labs significant for elevated troponin 1.5. Pt endorses slight constant chest pressure. States over past month have noticed the pressure more when walking dog. EKG concerning for anterior infarct.  NSTEMI: Elevated Troponin/ACS. heparin gtt ordered  stat ECHO- prelim EF 30%, possible LV thrombus, apex hypokenetic   plavix 300mg given. asa given. nitro sublingual   Urgent cath today- discussed with Dr. Nadir DONOVAN  Arm numbness:  neurology-code stroke called. head ct neg for acute infarct, no hemorrhage

## 2023-02-20 NOTE — ED PROVIDER NOTE - CLINICAL SUMMARY MEDICAL DECISION MAKING FREE TEXT BOX
77y Male with left arm weakness and decreased sensation since last night with no chest pain, headache. Hemodynamically stable, neurovascularly intact with decreased sensation along C8 dermatome and wrist drop on examination. 77y Male with left arm weakness and decreased sensation since last night with no chest pain, headache. Hemodynamically stable, neurovascularly intact with decreased sensation along C8 dermatome and wrist drop on examination. Concerning for but not limited to CVA, neuropathy, radiculopathy, electrolyte derangement. Labs, imagining reviewed. Will admit to telemetry.

## 2023-02-20 NOTE — CONSULT NOTE ADULT - ASSESSMENT
The patient is a 77y Male with chest pain and left hand/arm weakness.     Left hand weakness.   Distribution suggests radial nerve palsy.   Cannot do MRI secondary to shrapnel fragments.   Will have OT/PT evaluations   Likely will need outpatient PT on discharge.     Chest pain.   Cardiology evaluating.     Case discussed with ER team (Dr Ramirez attending.

## 2023-02-21 ENCOUNTER — APPOINTMENT (OUTPATIENT)
Dept: CARDIOTHORACIC SURGERY | Facility: HOSPITAL | Age: 78
End: 2023-02-21

## 2023-02-21 DIAGNOSIS — E11.9 TYPE 2 DIABETES MELLITUS WITHOUT COMPLICATIONS: ICD-10-CM

## 2023-02-21 PROBLEM — Z00.00 ENCOUNTER FOR PREVENTIVE HEALTH EXAMINATION: Status: ACTIVE | Noted: 2023-02-21

## 2023-02-21 LAB
ALBUMIN SERPL ELPH-MCNC: 3.5 G/DL — SIGNIFICANT CHANGE UP (ref 3.3–5.2)
ALBUMIN SERPL ELPH-MCNC: 3.6 G/DL — SIGNIFICANT CHANGE UP (ref 3.3–5.2)
ALP SERPL-CCNC: 62 U/L — SIGNIFICANT CHANGE UP (ref 40–120)
ALP SERPL-CCNC: 73 U/L — SIGNIFICANT CHANGE UP (ref 40–120)
ALT FLD-CCNC: 15 U/L — SIGNIFICANT CHANGE UP
ALT FLD-CCNC: 18 U/L — SIGNIFICANT CHANGE UP
ANION GAP SERPL CALC-SCNC: 14 MMOL/L — SIGNIFICANT CHANGE UP (ref 5–17)
ANION GAP SERPL CALC-SCNC: 14 MMOL/L — SIGNIFICANT CHANGE UP (ref 5–17)
APTT BLD: 26.5 SEC — LOW (ref 27.5–35.5)
AST SERPL-CCNC: 43 U/L — HIGH
AST SERPL-CCNC: 50 U/L — HIGH
BASE EXCESS BLDA CALC-SCNC: -0.5 MMOL/L — SIGNIFICANT CHANGE UP (ref -2–3)
BASE EXCESS BLDA CALC-SCNC: -0.8 MMOL/L — SIGNIFICANT CHANGE UP (ref -2–3)
BASE EXCESS BLDA CALC-SCNC: -2.5 MMOL/L — LOW (ref -2–3)
BASE EXCESS BLDA CALC-SCNC: 0.3 MMOL/L — SIGNIFICANT CHANGE UP (ref -2–3)
BASE EXCESS BLDA CALC-SCNC: 0.9 MMOL/L — SIGNIFICANT CHANGE UP (ref -2–3)
BASE EXCESS BLDA CALC-SCNC: 4.6 MMOL/L — HIGH (ref -2–3)
BASE EXCESS BLDV CALC-SCNC: -0.6 MMOL/L — SIGNIFICANT CHANGE UP (ref -2–3)
BASE EXCESS BLDV CALC-SCNC: -3.7 MMOL/L — LOW (ref -2–3)
BASE EXCESS BLDV CALC-SCNC: 1.4 MMOL/L — SIGNIFICANT CHANGE UP (ref -2–3)
BASE EXCESS BLDV CALC-SCNC: 7.4 MMOL/L — HIGH (ref -2–3)
BASOPHILS # BLD AUTO: 0.05 K/UL — SIGNIFICANT CHANGE UP (ref 0–0.2)
BASOPHILS # BLD AUTO: 0.05 K/UL — SIGNIFICANT CHANGE UP (ref 0–0.2)
BASOPHILS NFR BLD AUTO: 0.3 % — SIGNIFICANT CHANGE UP (ref 0–2)
BASOPHILS NFR BLD AUTO: 0.6 % — SIGNIFICANT CHANGE UP (ref 0–2)
BILIRUB SERPL-MCNC: 0.4 MG/DL — SIGNIFICANT CHANGE UP (ref 0.4–2)
BILIRUB SERPL-MCNC: 0.8 MG/DL — SIGNIFICANT CHANGE UP (ref 0.4–2)
BLOOD GAS COMMENTS, VENOUS: SIGNIFICANT CHANGE UP
BUN SERPL-MCNC: 14.5 MG/DL — SIGNIFICANT CHANGE UP (ref 8–20)
BUN SERPL-MCNC: 17.3 MG/DL — SIGNIFICANT CHANGE UP (ref 8–20)
CA-I BLDA-SCNC: 0.96 MMOL/L — LOW (ref 1.15–1.33)
CA-I BLDA-SCNC: 1.01 MMOL/L — LOW (ref 1.15–1.33)
CA-I BLDA-SCNC: 1.03 MMOL/L — LOW (ref 1.15–1.33)
CA-I BLDA-SCNC: 1.16 MMOL/L — SIGNIFICANT CHANGE UP (ref 1.15–1.33)
CA-I BLDA-SCNC: 1.17 MMOL/L — SIGNIFICANT CHANGE UP (ref 1.15–1.33)
CA-I BLDA-SCNC: 1.24 MMOL/L — SIGNIFICANT CHANGE UP (ref 1.15–1.33)
CA-I SERPL-SCNC: 0.94 MMOL/L — LOW (ref 1.15–1.33)
CA-I SERPL-SCNC: 1.02 MMOL/L — LOW (ref 1.15–1.33)
CA-I SERPL-SCNC: 1.27 MMOL/L — SIGNIFICANT CHANGE UP (ref 1.15–1.33)
CA-I SERPL-SCNC: 1.39 MMOL/L — HIGH (ref 1.15–1.33)
CALCIUM SERPL-MCNC: 9.2 MG/DL — SIGNIFICANT CHANGE UP (ref 8.4–10.5)
CALCIUM SERPL-MCNC: 9.4 MG/DL — SIGNIFICANT CHANGE UP (ref 8.4–10.5)
CHLORIDE BLDA-SCNC: 101 MMOL/L — SIGNIFICANT CHANGE UP (ref 96–108)
CHLORIDE BLDA-SCNC: 103 MMOL/L — SIGNIFICANT CHANGE UP (ref 96–108)
CHLORIDE BLDA-SCNC: 104 MMOL/L — SIGNIFICANT CHANGE UP (ref 96–108)
CHLORIDE BLDA-SCNC: 104 MMOL/L — SIGNIFICANT CHANGE UP (ref 96–108)
CHLORIDE BLDA-SCNC: 105 MMOL/L — SIGNIFICANT CHANGE UP (ref 96–108)
CHLORIDE BLDA-SCNC: 106 MMOL/L — SIGNIFICANT CHANGE UP (ref 96–108)
CHLORIDE BLDV-SCNC: 104 MMOL/L — SIGNIFICANT CHANGE UP (ref 96–108)
CHLORIDE BLDV-SCNC: 105 MMOL/L — SIGNIFICANT CHANGE UP (ref 96–108)
CHLORIDE SERPL-SCNC: 101 MMOL/L — SIGNIFICANT CHANGE UP (ref 96–108)
CHLORIDE SERPL-SCNC: 104 MMOL/L — SIGNIFICANT CHANGE UP (ref 96–108)
CHOLEST SERPL-MCNC: 105 MG/DL — SIGNIFICANT CHANGE UP
CK MB CFR SERPL CALC: 16.8 NG/ML — HIGH (ref 0–6.7)
CK SERPL-CCNC: 226 U/L — HIGH (ref 30–200)
CO2 SERPL-SCNC: 21 MMOL/L — LOW (ref 22–29)
CO2 SERPL-SCNC: 22 MMOL/L — SIGNIFICANT CHANGE UP (ref 22–29)
COHGB MFR BLDA: 1.2 % — SIGNIFICANT CHANGE UP
COHGB MFR BLDA: 1.4 % — SIGNIFICANT CHANGE UP
COHGB MFR BLDA: 1.5 % — SIGNIFICANT CHANGE UP
COHGB MFR BLDA: 1.7 % — SIGNIFICANT CHANGE UP
COHGB MFR BLDA: 1.9 % — SIGNIFICANT CHANGE UP
COHGB MFR BLDA: 1.9 % — SIGNIFICANT CHANGE UP
COHGB MFR BLDV: 1.7 % — SIGNIFICANT CHANGE UP
COHGB MFR BLDV: 2 % — SIGNIFICANT CHANGE UP
COHGB MFR BLDV: 2 % — SIGNIFICANT CHANGE UP
CREAT SERPL-MCNC: 0.82 MG/DL — SIGNIFICANT CHANGE UP (ref 0.5–1.3)
CREAT SERPL-MCNC: 0.89 MG/DL — SIGNIFICANT CHANGE UP (ref 0.5–1.3)
CULTURE RESULTS: NO GROWTH — SIGNIFICANT CHANGE UP
EGFR: 88 ML/MIN/1.73M2 — SIGNIFICANT CHANGE UP
EGFR: 90 ML/MIN/1.73M2 — SIGNIFICANT CHANGE UP
EOSINOPHIL # BLD AUTO: 0.09 K/UL — SIGNIFICANT CHANGE UP (ref 0–0.5)
EOSINOPHIL # BLD AUTO: 0.21 K/UL — SIGNIFICANT CHANGE UP (ref 0–0.5)
EOSINOPHIL NFR BLD AUTO: 0.6 % — SIGNIFICANT CHANGE UP (ref 0–6)
EOSINOPHIL NFR BLD AUTO: 2.4 % — SIGNIFICANT CHANGE UP (ref 0–6)
GAS PNL BLDA: SIGNIFICANT CHANGE UP
GAS PNL BLDV: 135 MMOL/L — LOW (ref 136–145)
GAS PNL BLDV: 136 MMOL/L — SIGNIFICANT CHANGE UP (ref 136–145)
GAS PNL BLDV: 137 MMOL/L — SIGNIFICANT CHANGE UP (ref 136–145)
GAS PNL BLDV: 138 MMOL/L — SIGNIFICANT CHANGE UP (ref 136–145)
GAS PNL BLDV: SIGNIFICANT CHANGE UP
GAS PNL BLDV: SIGNIFICANT CHANGE UP
GLUCOSE BLDA-MCNC: 159 MG/DL — HIGH (ref 70–99)
GLUCOSE BLDA-MCNC: 171 MG/DL — HIGH (ref 70–99)
GLUCOSE BLDA-MCNC: 177 MG/DL — HIGH (ref 70–99)
GLUCOSE BLDA-MCNC: 178 MG/DL — HIGH (ref 70–99)
GLUCOSE BLDA-MCNC: 181 MG/DL — HIGH (ref 70–99)
GLUCOSE BLDA-MCNC: 183 MG/DL — HIGH (ref 70–99)
GLUCOSE BLDC GLUCOMTR-MCNC: 120 MG/DL — HIGH (ref 70–99)
GLUCOSE BLDC GLUCOMTR-MCNC: 123 MG/DL — HIGH (ref 70–99)
GLUCOSE BLDC GLUCOMTR-MCNC: 125 MG/DL — HIGH (ref 70–99)
GLUCOSE BLDC GLUCOMTR-MCNC: 127 MG/DL — HIGH (ref 70–99)
GLUCOSE BLDC GLUCOMTR-MCNC: 138 MG/DL — HIGH (ref 70–99)
GLUCOSE BLDC GLUCOMTR-MCNC: 145 MG/DL — HIGH (ref 70–99)
GLUCOSE BLDC GLUCOMTR-MCNC: 153 MG/DL — HIGH (ref 70–99)
GLUCOSE BLDV-MCNC: 136 MG/DL — HIGH (ref 70–99)
GLUCOSE BLDV-MCNC: 158 MG/DL — HIGH (ref 70–99)
GLUCOSE BLDV-MCNC: 174 MG/DL — HIGH (ref 70–99)
GLUCOSE BLDV-MCNC: 181 MG/DL — HIGH (ref 70–99)
GLUCOSE SERPL-MCNC: 143 MG/DL — HIGH (ref 70–99)
GLUCOSE SERPL-MCNC: 171 MG/DL — HIGH (ref 70–99)
HCO3 BLDA-SCNC: 21 MMOL/L — SIGNIFICANT CHANGE UP (ref 21–28)
HCO3 BLDA-SCNC: 24 MMOL/L — SIGNIFICANT CHANGE UP (ref 21–28)
HCO3 BLDA-SCNC: 25 MMOL/L — SIGNIFICANT CHANGE UP (ref 21–28)
HCO3 BLDA-SCNC: 28 MMOL/L — SIGNIFICANT CHANGE UP (ref 21–28)
HCO3 BLDV-SCNC: 21 MMOL/L — LOW (ref 22–29)
HCO3 BLDV-SCNC: 24 MMOL/L — SIGNIFICANT CHANGE UP (ref 22–29)
HCO3 BLDV-SCNC: 26 MMOL/L — SIGNIFICANT CHANGE UP (ref 22–29)
HCO3 BLDV-SCNC: 30 MMOL/L — HIGH (ref 22–29)
HCOV PNL SPEC NAA+PROBE: DETECTED
HCT VFR BLD CALC: 27.5 % — LOW (ref 39–50)
HCT VFR BLD CALC: 35.7 % — LOW (ref 39–50)
HCT VFR BLDA CALC: 24 % — SIGNIFICANT CHANGE UP
HCT VFR BLDA CALC: 25 % — SIGNIFICANT CHANGE UP
HCT VFR BLDA CALC: 27 % — SIGNIFICANT CHANGE UP
HCT VFR BLDA CALC: 27 % — SIGNIFICANT CHANGE UP
HCT VFR BLDA CALC: 28 % — SIGNIFICANT CHANGE UP
HCT VFR BLDA CALC: 28 % — SIGNIFICANT CHANGE UP
HCT VFR BLDA CALC: 29 % — SIGNIFICANT CHANGE UP
HCT VFR BLDA CALC: 30 % — SIGNIFICANT CHANGE UP
HCT VFR BLDA CALC: 38 % — SIGNIFICANT CHANGE UP
HCT VFR BLDA CALC: 41 % — SIGNIFICANT CHANGE UP
HCV AB S/CO SERPL IA: 0.05 S/CO — SIGNIFICANT CHANGE UP (ref 0–0.99)
HCV AB SERPL-IMP: SIGNIFICANT CHANGE UP
HDLC SERPL-MCNC: 34 MG/DL — LOW
HGB BLD CALC-MCNC: 10.1 G/DL — LOW (ref 12.6–17.4)
HGB BLD CALC-MCNC: 8.3 G/DL — LOW (ref 12.6–17.4)
HGB BLD CALC-MCNC: 9 G/DL — LOW (ref 12.6–17.4)
HGB BLD CALC-MCNC: 9.7 G/DL — LOW (ref 12.6–17.4)
HGB BLD-MCNC: 12.2 G/DL — LOW (ref 13–17)
HGB BLD-MCNC: 9.6 G/DL — LOW (ref 13–17)
HGB BLDA-MCNC: 12.6 G/DL — SIGNIFICANT CHANGE UP (ref 12.6–17.4)
HGB BLDA-MCNC: 13.7 G/DL — SIGNIFICANT CHANGE UP (ref 12.6–17.4)
HGB BLDA-MCNC: 8.1 G/DL — LOW (ref 12.6–17.4)
HGB BLDA-MCNC: 8.9 G/DL — LOW (ref 12.6–17.4)
HGB BLDA-MCNC: 9.4 G/DL — LOW (ref 12.6–17.4)
HGB BLDA-MCNC: 9.4 G/DL — LOW (ref 12.6–17.4)
HOROWITZ INDEX BLDV+IHG-RTO: 60 — SIGNIFICANT CHANGE UP
IMM GRANULOCYTES NFR BLD AUTO: 0.2 % — SIGNIFICANT CHANGE UP (ref 0–0.9)
IMM GRANULOCYTES NFR BLD AUTO: 1.4 % — HIGH (ref 0–0.9)
INR BLD: 1.3 RATIO — HIGH (ref 0.88–1.16)
LACTATE BLDA-MCNC: 0.8 MMOL/L — SIGNIFICANT CHANGE UP (ref 0.5–2)
LACTATE BLDA-MCNC: 1.2 MMOL/L — SIGNIFICANT CHANGE UP (ref 0.5–2)
LACTATE BLDA-MCNC: 2 MMOL/L — SIGNIFICANT CHANGE UP (ref 0.5–2)
LACTATE BLDA-MCNC: 2.3 MMOL/L — HIGH (ref 0.5–2)
LACTATE BLDA-MCNC: 2.4 MMOL/L — HIGH (ref 0.5–2)
LACTATE BLDA-MCNC: 2.6 MMOL/L — HIGH (ref 0.5–2)
LACTATE BLDV-MCNC: 2 MMOL/L — SIGNIFICANT CHANGE UP (ref 0.5–2)
LACTATE BLDV-MCNC: 2.1 MMOL/L — HIGH (ref 0.5–2)
LACTATE BLDV-MCNC: 2.1 MMOL/L — HIGH (ref 0.5–2)
LACTATE BLDV-MCNC: 2.8 MMOL/L — HIGH (ref 0.5–2)
LIPID PNL WITH DIRECT LDL SERPL: 49 MG/DL — SIGNIFICANT CHANGE UP
LYMPHOCYTES # BLD AUTO: 0.98 K/UL — LOW (ref 1–3.3)
LYMPHOCYTES # BLD AUTO: 2.02 K/UL — SIGNIFICANT CHANGE UP (ref 1–3.3)
LYMPHOCYTES # BLD AUTO: 23 % — SIGNIFICANT CHANGE UP (ref 13–44)
LYMPHOCYTES # BLD AUTO: 6.3 % — LOW (ref 13–44)
MAGNESIUM SERPL-MCNC: 2.3 MG/DL — SIGNIFICANT CHANGE UP (ref 1.6–2.6)
MCHC RBC-ENTMCNC: 30.2 PG — SIGNIFICANT CHANGE UP (ref 27–34)
MCHC RBC-ENTMCNC: 31.6 PG — SIGNIFICANT CHANGE UP (ref 27–34)
MCHC RBC-ENTMCNC: 34.2 GM/DL — SIGNIFICANT CHANGE UP (ref 32–36)
MCHC RBC-ENTMCNC: 34.9 GM/DL — SIGNIFICANT CHANGE UP (ref 32–36)
MCV RBC AUTO: 88.4 FL — SIGNIFICANT CHANGE UP (ref 80–100)
MCV RBC AUTO: 90.5 FL — SIGNIFICANT CHANGE UP (ref 80–100)
METHGB MFR BLDA: 0.5 % — SIGNIFICANT CHANGE UP
METHGB MFR BLDA: 0.7 % — SIGNIFICANT CHANGE UP
METHGB MFR BLDA: 0.8 % — SIGNIFICANT CHANGE UP
METHGB MFR BLDA: 0.8 % — SIGNIFICANT CHANGE UP
METHGB MFR BLDA: 0.9 % — SIGNIFICANT CHANGE UP
METHGB MFR BLDA: 1.2 % — SIGNIFICANT CHANGE UP
METHGB MFR BLDV: 0.3 % — SIGNIFICANT CHANGE UP
METHGB MFR BLDV: 0.6 % — SIGNIFICANT CHANGE UP
METHGB MFR BLDV: 1.3 % — SIGNIFICANT CHANGE UP
MONOCYTES # BLD AUTO: 0.71 K/UL — SIGNIFICANT CHANGE UP (ref 0–0.9)
MONOCYTES # BLD AUTO: 0.72 K/UL — SIGNIFICANT CHANGE UP (ref 0–0.9)
MONOCYTES NFR BLD AUTO: 4.6 % — SIGNIFICANT CHANGE UP (ref 2–14)
MONOCYTES NFR BLD AUTO: 8.1 % — SIGNIFICANT CHANGE UP (ref 2–14)
MRSA PCR RESULT.: SIGNIFICANT CHANGE UP
NEUTROPHILS # BLD AUTO: 13.55 K/UL — HIGH (ref 1.8–7.4)
NEUTROPHILS # BLD AUTO: 5.76 K/UL — SIGNIFICANT CHANGE UP (ref 1.8–7.4)
NEUTROPHILS NFR BLD AUTO: 65.7 % — SIGNIFICANT CHANGE UP (ref 43–77)
NEUTROPHILS NFR BLD AUTO: 86.8 % — HIGH (ref 43–77)
NON HDL CHOLESTEROL: 71 MG/DL — SIGNIFICANT CHANGE UP
OXYHGB MFR BLDA: 97 % — HIGH (ref 90–95)
OXYHGB MFR BLDA: 98 % — HIGH (ref 90–95)
PA ADP PRP-ACNC: 252 PRU — SIGNIFICANT CHANGE UP (ref 180–376)
PCO2 BLDA: 30 MMHG — LOW (ref 35–48)
PCO2 BLDA: 33 MMHG — LOW (ref 35–48)
PCO2 BLDA: 37 MMHG — SIGNIFICANT CHANGE UP (ref 35–48)
PCO2 BLDA: 37 MMHG — SIGNIFICANT CHANGE UP (ref 35–48)
PCO2 BLDA: 40 MMHG — SIGNIFICANT CHANGE UP (ref 35–48)
PCO2 BLDA: 40 MMHG — SIGNIFICANT CHANGE UP (ref 35–48)
PCO2 BLDV: 32 MMHG — LOW (ref 42–55)
PCO2 BLDV: 35 MMHG — LOW (ref 42–55)
PCO2 BLDV: 39 MMHG — LOW (ref 42–55)
PCO2 BLDV: 41 MMHG — LOW (ref 42–55)
PH BLDA: 7.39 — SIGNIFICANT CHANGE UP (ref 7.35–7.45)
PH BLDA: 7.42 — SIGNIFICANT CHANGE UP (ref 7.35–7.45)
PH BLDA: 7.44 — SIGNIFICANT CHANGE UP (ref 7.35–7.45)
PH BLDA: 7.46 — HIGH (ref 7.35–7.45)
PH BLDA: 7.46 — HIGH (ref 7.35–7.45)
PH BLDA: 7.47 — HIGH (ref 7.35–7.45)
PH BLDV: 7.4 — SIGNIFICANT CHANGE UP (ref 7.32–7.43)
PH BLDV: 7.41 — SIGNIFICANT CHANGE UP (ref 7.32–7.43)
PH BLDV: 7.42 — SIGNIFICANT CHANGE UP (ref 7.32–7.43)
PH BLDV: 7.54 — HIGH (ref 7.32–7.43)
PLATELET # BLD AUTO: 145 K/UL — LOW (ref 150–400)
PLATELET # BLD AUTO: 229 K/UL — SIGNIFICANT CHANGE UP (ref 150–400)
PO2 BLDA: 321 MMHG — HIGH (ref 83–108)
PO2 BLDA: 370 MMHG — HIGH (ref 83–108)
PO2 BLDA: 404 MMHG — HIGH (ref 83–108)
PO2 BLDA: 437 MMHG — HIGH (ref 83–108)
PO2 BLDA: >496 MMHG — HIGH (ref 83–108)
PO2 BLDA: >496 MMHG — HIGH (ref 83–108)
PO2 BLDV: 48 MMHG — HIGH (ref 25–45)
PO2 BLDV: 49 MMHG — HIGH (ref 25–45)
PO2 BLDV: 56 MMHG — HIGH (ref 25–45)
PO2 BLDV: <42 MMHG — SIGNIFICANT CHANGE UP (ref 25–45)
POTASSIUM BLDA-SCNC: 3.3 MMOL/L — LOW (ref 3.5–5.1)
POTASSIUM BLDA-SCNC: 3.7 MMOL/L — SIGNIFICANT CHANGE UP (ref 3.5–5.1)
POTASSIUM BLDA-SCNC: 3.7 MMOL/L — SIGNIFICANT CHANGE UP (ref 3.5–5.1)
POTASSIUM BLDA-SCNC: 3.8 MMOL/L — SIGNIFICANT CHANGE UP (ref 3.5–5.1)
POTASSIUM BLDA-SCNC: 4.2 MMOL/L — SIGNIFICANT CHANGE UP (ref 3.5–5.1)
POTASSIUM BLDA-SCNC: 4.7 MMOL/L — SIGNIFICANT CHANGE UP (ref 3.5–5.1)
POTASSIUM BLDV-SCNC: 3.6 MMOL/L — SIGNIFICANT CHANGE UP (ref 3.5–5.1)
POTASSIUM BLDV-SCNC: 3.6 MMOL/L — SIGNIFICANT CHANGE UP (ref 3.5–5.1)
POTASSIUM BLDV-SCNC: 3.9 MMOL/L — SIGNIFICANT CHANGE UP (ref 3.5–5.1)
POTASSIUM BLDV-SCNC: 4.5 MMOL/L — SIGNIFICANT CHANGE UP (ref 3.5–5.1)
POTASSIUM SERPL-MCNC: 3.8 MMOL/L — SIGNIFICANT CHANGE UP (ref 3.5–5.3)
POTASSIUM SERPL-MCNC: 3.9 MMOL/L — SIGNIFICANT CHANGE UP (ref 3.5–5.3)
POTASSIUM SERPL-SCNC: 3.8 MMOL/L — SIGNIFICANT CHANGE UP (ref 3.5–5.3)
POTASSIUM SERPL-SCNC: 3.9 MMOL/L — SIGNIFICANT CHANGE UP (ref 3.5–5.3)
PREALB SERPL-MCNC: 19 MG/DL — SIGNIFICANT CHANGE UP (ref 18–38)
PROT SERPL-MCNC: 5.4 G/DL — LOW (ref 6.6–8.7)
PROT SERPL-MCNC: 6.1 G/DL — LOW (ref 6.6–8.7)
PROTHROM AB SERPL-ACNC: 15.1 SEC — HIGH (ref 10.5–13.4)
RAPID RVP RESULT: DETECTED
RBC # BLD: 3.04 M/UL — LOW (ref 4.2–5.8)
RBC # BLD: 4.04 M/UL — LOW (ref 4.2–5.8)
RBC # FLD: 13.2 % — SIGNIFICANT CHANGE UP (ref 10.3–14.5)
RBC # FLD: 13.2 % — SIGNIFICANT CHANGE UP (ref 10.3–14.5)
S AUREUS DNA NOSE QL NAA+PROBE: SIGNIFICANT CHANGE UP
SAO2 % BLDA: 100 % — HIGH (ref 94–98)
SAO2 % BLDA: 99.8 % — HIGH (ref 94–98)
SAO2 % BLDA: 99.9 % — HIGH (ref 94–98)
SAO2 % BLDV: 74.2 % — SIGNIFICANT CHANGE UP
SAO2 % BLDV: 87.3 % — SIGNIFICANT CHANGE UP (ref 67–88)
SAO2 % BLDV: 91.4 % — HIGH (ref 67–88)
SAO2 % BLDV: 92.2 % — HIGH (ref 67–88)
SARS-COV-2 RNA SPEC QL NAA+PROBE: SIGNIFICANT CHANGE UP
SODIUM BLDA-SCNC: 134 MMOL/L — LOW (ref 136–145)
SODIUM BLDA-SCNC: 136 MMOL/L — SIGNIFICANT CHANGE UP (ref 136–145)
SODIUM BLDA-SCNC: 136 MMOL/L — SIGNIFICANT CHANGE UP (ref 136–145)
SODIUM BLDA-SCNC: 137 MMOL/L — SIGNIFICANT CHANGE UP (ref 136–145)
SODIUM SERPL-SCNC: 136 MMOL/L — SIGNIFICANT CHANGE UP (ref 135–145)
SODIUM SERPL-SCNC: 140 MMOL/L — SIGNIFICANT CHANGE UP (ref 135–145)
SPECIMEN SOURCE: SIGNIFICANT CHANGE UP
T3 SERPL-MCNC: 97 NG/DL — SIGNIFICANT CHANGE UP (ref 80–200)
T4 AB SER-ACNC: 7.5 UG/DL — SIGNIFICANT CHANGE UP (ref 4.5–12)
TRIGL SERPL-MCNC: 112 MG/DL — SIGNIFICANT CHANGE UP
TROPONIN T SERPL-MCNC: 3.04 NG/ML — HIGH (ref 0–0.06)
TSH SERPL-MCNC: 1.3 UIU/ML — SIGNIFICANT CHANGE UP (ref 0.27–4.2)
WBC # BLD: 15.61 K/UL — HIGH (ref 3.8–10.5)
WBC # BLD: 8.77 K/UL — SIGNIFICANT CHANGE UP (ref 3.8–10.5)
WBC # FLD AUTO: 15.61 K/UL — HIGH (ref 3.8–10.5)
WBC # FLD AUTO: 8.77 K/UL — SIGNIFICANT CHANGE UP (ref 3.8–10.5)

## 2023-02-21 PROCEDURE — 99291 CRITICAL CARE FIRST HOUR: CPT

## 2023-02-21 PROCEDURE — 33518 CABG ARTERY-VEIN TWO: CPT | Mod: AS

## 2023-02-21 PROCEDURE — 93010 ELECTROCARDIOGRAM REPORT: CPT

## 2023-02-21 PROCEDURE — 71045 X-RAY EXAM CHEST 1 VIEW: CPT | Mod: 26

## 2023-02-21 PROCEDURE — 33518 CABG ARTERY-VEIN TWO: CPT

## 2023-02-21 PROCEDURE — 33533 CABG ARTERIAL SINGLE: CPT | Mod: AS

## 2023-02-21 PROCEDURE — 33533 CABG ARTERIAL SINGLE: CPT

## 2023-02-21 PROCEDURE — 33508 ENDOSCOPIC VEIN HARVEST: CPT | Mod: 59

## 2023-02-21 DEVICE — CANNULA AORTIC ROOT WITH VENT LINE 12G X 14CM FLANGED: Type: IMPLANTABLE DEVICE | Status: FUNCTIONAL

## 2023-02-21 DEVICE — FLOSEAL FAST PREP 10ML: Type: IMPLANTABLE DEVICE | Status: FUNCTIONAL

## 2023-02-21 DEVICE — LIGATING CLIPS WECK HORIZON MEDIUM (BLUE) 24: Type: IMPLANTABLE DEVICE | Status: FUNCTIONAL

## 2023-02-21 DEVICE — SURGICEL NU-KNIT 6 X 9": Type: IMPLANTABLE DEVICE | Status: FUNCTIONAL

## 2023-02-21 DEVICE — PACING WIRE ORANGE M-25 WINGED WIRE 37MM X 89MM: Type: IMPLANTABLE DEVICE | Status: FUNCTIONAL

## 2023-02-21 DEVICE — CANNULA VESSEL 3MM BLUNT TIP CLEAR 1-WAY VALVE: Type: IMPLANTABLE DEVICE | Status: FUNCTIONAL

## 2023-02-21 DEVICE — SURGICEL FIBRILLAR 4 X 4": Type: IMPLANTABLE DEVICE | Status: FUNCTIONAL

## 2023-02-21 DEVICE — CATH THERMAL OXI SWAN GANZ DILUTION 7.5FR: Type: IMPLANTABLE DEVICE | Status: FUNCTIONAL

## 2023-02-21 DEVICE — CANNULA ATRASUMP 1/4" X 38CM: Type: IMPLANTABLE DEVICE | Status: FUNCTIONAL

## 2023-02-21 DEVICE — LIGATING CLIPS WECK HORIZON LARGE (ORANGE) 6: Type: IMPLANTABLE DEVICE | Status: FUNCTIONAL

## 2023-02-21 DEVICE — CANNULA IMA 1MM BLUNT TIP: Type: IMPLANTABLE DEVICE | Status: FUNCTIONAL

## 2023-02-21 DEVICE — CANNULA ARTERIAL SOFT-FLOW 21FR EXTENDED VENTED: Type: IMPLANTABLE DEVICE | Status: FUNCTIONAL

## 2023-02-21 DEVICE — CANNULA VENOUS 2 STAGE OPEN LIGHTHOUSE TIP 32-40FR X 1/2" NON-VENTED: Type: IMPLANTABLE DEVICE | Status: FUNCTIONAL

## 2023-02-21 DEVICE — LIGATING CLIPS WECK HORIZON SMALL-WIDE (RED) 24: Type: IMPLANTABLE DEVICE | Status: FUNCTIONAL

## 2023-02-21 DEVICE — KIT CVC 2LUM MAC 9FR CHG: Type: IMPLANTABLE DEVICE | Status: FUNCTIONAL

## 2023-02-21 DEVICE — PACING WIRE CLEAR 0 24" SC-2 CV-24: Type: IMPLANTABLE DEVICE | Status: FUNCTIONAL

## 2023-02-21 DEVICE — KIT A-LINE 1LUM 20G X 12CM SAFE KIT: Type: IMPLANTABLE DEVICE | Status: FUNCTIONAL

## 2023-02-21 DEVICE — CHEST DRAIN PLEUR-EVAC 32FR STRAIGHT: Type: IMPLANTABLE DEVICE | Status: FUNCTIONAL

## 2023-02-21 RX ORDER — ATORVASTATIN CALCIUM 80 MG/1
80 TABLET, FILM COATED ORAL AT BEDTIME
Refills: 0 | Status: DISCONTINUED | OUTPATIENT
Start: 2023-02-21 | End: 2023-02-23

## 2023-02-21 RX ORDER — CHLORHEXIDINE GLUCONATE 213 G/1000ML
15 SOLUTION TOPICAL EVERY 12 HOURS
Refills: 0 | Status: DISCONTINUED | OUTPATIENT
Start: 2023-02-21 | End: 2023-02-22

## 2023-02-21 RX ORDER — NICARDIPINE HYDROCHLORIDE 30 MG/1
5 CAPSULE, EXTENDED RELEASE ORAL
Qty: 40 | Refills: 0 | Status: DISCONTINUED | OUTPATIENT
Start: 2023-02-21 | End: 2023-02-22

## 2023-02-21 RX ORDER — DEXTROSE 50 % IN WATER 50 %
25 SYRINGE (ML) INTRAVENOUS
Refills: 0 | Status: DISCONTINUED | OUTPATIENT
Start: 2023-02-21 | End: 2023-02-27

## 2023-02-21 RX ORDER — CEFUROXIME AXETIL 250 MG
1500 TABLET ORAL EVERY 8 HOURS
Refills: 0 | Status: DISCONTINUED | OUTPATIENT
Start: 2023-02-21 | End: 2023-02-21

## 2023-02-21 RX ORDER — ALBUMIN HUMAN 25 %
250 VIAL (ML) INTRAVENOUS
Refills: 0 | Status: DISCONTINUED | OUTPATIENT
Start: 2023-02-21 | End: 2023-02-21

## 2023-02-21 RX ORDER — INSULIN HUMAN 100 [IU]/ML
4 INJECTION, SOLUTION SUBCUTANEOUS
Qty: 100 | Refills: 0 | Status: DISCONTINUED | OUTPATIENT
Start: 2023-02-21 | End: 2023-02-23

## 2023-02-21 RX ORDER — PROPOFOL 10 MG/ML
10 INJECTION, EMULSION INTRAVENOUS
Qty: 1000 | Refills: 0 | Status: DISCONTINUED | OUTPATIENT
Start: 2023-02-21 | End: 2023-02-21

## 2023-02-21 RX ORDER — NICARDIPINE HYDROCHLORIDE 30 MG/1
5 CAPSULE, EXTENDED RELEASE ORAL
Qty: 40 | Refills: 0 | Status: DISCONTINUED | OUTPATIENT
Start: 2023-02-21 | End: 2023-02-21

## 2023-02-21 RX ORDER — DEXTROSE 50 % IN WATER 50 %
50 SYRINGE (ML) INTRAVENOUS
Refills: 0 | Status: DISCONTINUED | OUTPATIENT
Start: 2023-02-21 | End: 2023-02-27

## 2023-02-21 RX ORDER — CEFUROXIME AXETIL 250 MG
1500 TABLET ORAL EVERY 8 HOURS
Refills: 0 | Status: COMPLETED | OUTPATIENT
Start: 2023-02-21 | End: 2023-02-23

## 2023-02-21 RX ORDER — ALBUMIN HUMAN 25 %
250 VIAL (ML) INTRAVENOUS
Refills: 0 | Status: DISCONTINUED | OUTPATIENT
Start: 2023-02-21 | End: 2023-02-23

## 2023-02-21 RX ORDER — VANCOMYCIN HCL 1 G
1250 VIAL (EA) INTRAVENOUS EVERY 12 HOURS
Refills: 0 | Status: DISCONTINUED | OUTPATIENT
Start: 2023-02-21 | End: 2023-02-21

## 2023-02-21 RX ORDER — ACETAMINOPHEN 500 MG
1000 TABLET ORAL ONCE
Refills: 0 | Status: COMPLETED | OUTPATIENT
Start: 2023-02-21 | End: 2023-02-22

## 2023-02-21 RX ORDER — CHLORHEXIDINE GLUCONATE 213 G/1000ML
1 SOLUTION TOPICAL
Refills: 0 | Status: DISCONTINUED | OUTPATIENT
Start: 2023-02-21 | End: 2023-02-23

## 2023-02-21 RX ORDER — DULOXETINE HYDROCHLORIDE 30 MG/1
60 CAPSULE, DELAYED RELEASE ORAL DAILY
Refills: 0 | Status: DISCONTINUED | OUTPATIENT
Start: 2023-02-21 | End: 2023-02-27

## 2023-02-21 RX ORDER — POTASSIUM CHLORIDE 20 MEQ
10 PACKET (EA) ORAL
Refills: 0 | Status: DISCONTINUED | OUTPATIENT
Start: 2023-02-21 | End: 2023-02-22

## 2023-02-21 RX ORDER — ONDANSETRON 8 MG/1
4 TABLET, FILM COATED ORAL EVERY 4 HOURS
Refills: 0 | Status: DISCONTINUED | OUTPATIENT
Start: 2023-02-21 | End: 2023-02-27

## 2023-02-21 RX ORDER — PANTOPRAZOLE SODIUM 20 MG/1
40 TABLET, DELAYED RELEASE ORAL DAILY
Refills: 0 | Status: DISCONTINUED | OUTPATIENT
Start: 2023-02-22 | End: 2023-02-27

## 2023-02-21 RX ORDER — ASPIRIN/CALCIUM CARB/MAGNESIUM 324 MG
81 TABLET ORAL DAILY
Refills: 0 | Status: DISCONTINUED | OUTPATIENT
Start: 2023-02-22 | End: 2023-02-27

## 2023-02-21 RX ORDER — SENNA PLUS 8.6 MG/1
2 TABLET ORAL AT BEDTIME
Refills: 0 | Status: DISCONTINUED | OUTPATIENT
Start: 2023-02-21 | End: 2023-02-24

## 2023-02-21 RX ORDER — VANCOMYCIN HCL 1 G
1250 VIAL (EA) INTRAVENOUS EVERY 12 HOURS
Refills: 0 | Status: COMPLETED | OUTPATIENT
Start: 2023-02-21 | End: 2023-02-23

## 2023-02-21 RX ORDER — NOREPINEPHRINE BITARTRATE/D5W 8 MG/250ML
0.02 PLASTIC BAG, INJECTION (ML) INTRAVENOUS
Qty: 8 | Refills: 0 | Status: DISCONTINUED | OUTPATIENT
Start: 2023-02-21 | End: 2023-02-21

## 2023-02-21 RX ORDER — SODIUM CHLORIDE 9 MG/ML
500 INJECTION, SOLUTION INTRAVENOUS
Refills: 0 | Status: DISCONTINUED | OUTPATIENT
Start: 2023-02-21 | End: 2023-02-21

## 2023-02-21 RX ORDER — MILRINONE LACTATE 1 MG/ML
0.2 INJECTION, SOLUTION INTRAVENOUS
Qty: 20 | Refills: 0 | Status: DISCONTINUED | OUTPATIENT
Start: 2023-02-21 | End: 2023-02-22

## 2023-02-21 RX ORDER — ALBUMIN HUMAN 25 %
250 VIAL (ML) INTRAVENOUS ONCE
Refills: 0 | Status: COMPLETED | OUTPATIENT
Start: 2023-02-21 | End: 2023-02-21

## 2023-02-21 RX ORDER — PANTOPRAZOLE SODIUM 20 MG/1
40 TABLET, DELAYED RELEASE ORAL ONCE
Refills: 0 | Status: COMPLETED | OUTPATIENT
Start: 2023-02-21 | End: 2023-02-21

## 2023-02-21 RX ORDER — SODIUM CHLORIDE 9 MG/ML
1000 INJECTION INTRAMUSCULAR; INTRAVENOUS; SUBCUTANEOUS
Refills: 0 | Status: DISCONTINUED | OUTPATIENT
Start: 2023-02-21 | End: 2023-02-23

## 2023-02-21 RX ORDER — SODIUM CHLORIDE 9 MG/ML
250 INJECTION, SOLUTION INTRAVENOUS ONCE
Refills: 0 | Status: COMPLETED | OUTPATIENT
Start: 2023-02-21 | End: 2023-02-21

## 2023-02-21 RX ORDER — NOREPINEPHRINE BITARTRATE/D5W 8 MG/250ML
0.02 PLASTIC BAG, INJECTION (ML) INTRAVENOUS
Qty: 8 | Refills: 0 | Status: DISCONTINUED | OUTPATIENT
Start: 2023-02-21 | End: 2023-02-22

## 2023-02-21 RX ORDER — POLYETHYLENE GLYCOL 3350 17 G/17G
17 POWDER, FOR SOLUTION ORAL DAILY
Refills: 0 | Status: DISCONTINUED | OUTPATIENT
Start: 2023-02-21 | End: 2023-02-24

## 2023-02-21 RX ORDER — ASPIRIN/CALCIUM CARB/MAGNESIUM 324 MG
81 TABLET ORAL ONCE
Refills: 0 | Status: COMPLETED | OUTPATIENT
Start: 2023-02-21 | End: 2023-02-21

## 2023-02-21 RX ORDER — ALBUMIN HUMAN 25 %
250 VIAL (ML) INTRAVENOUS
Refills: 0 | Status: COMPLETED | OUTPATIENT
Start: 2023-02-21 | End: 2023-02-21

## 2023-02-21 RX ORDER — POTASSIUM CHLORIDE 20 MEQ
10 PACKET (EA) ORAL
Refills: 0 | Status: COMPLETED | OUTPATIENT
Start: 2023-02-21 | End: 2023-02-21

## 2023-02-21 RX ADMIN — SODIUM CHLORIDE 3 MILLILITER(S): 9 INJECTION INTRAMUSCULAR; INTRAVENOUS; SUBCUTANEOUS at 05:50

## 2023-02-21 RX ADMIN — Medication 3.01 MICROGRAM(S)/KG/MIN: at 20:44

## 2023-02-21 RX ADMIN — ATORVASTATIN CALCIUM 80 MILLIGRAM(S): 80 TABLET, FILM COATED ORAL at 21:35

## 2023-02-21 RX ADMIN — INSULIN HUMAN 4 UNIT(S)/HR: 100 INJECTION, SOLUTION SUBCUTANEOUS at 14:13

## 2023-02-21 RX ADMIN — Medication 100 MILLIEQUIVALENT(S): at 16:33

## 2023-02-21 RX ADMIN — NICARDIPINE HYDROCHLORIDE 25 MG/HR: 30 CAPSULE, EXTENDED RELEASE ORAL at 20:43

## 2023-02-21 RX ADMIN — Medication 100 MILLIEQUIVALENT(S): at 16:11

## 2023-02-21 RX ADMIN — MILRINONE LACTATE 9.02 MICROGRAM(S)/KG/MIN: 1 INJECTION, SOLUTION INTRAVENOUS at 20:44

## 2023-02-21 RX ADMIN — SENNA PLUS 2 TABLET(S): 8.6 TABLET ORAL at 21:34

## 2023-02-21 RX ADMIN — Medication 81 MILLIGRAM(S): at 20:48

## 2023-02-21 RX ADMIN — Medication 125 MILLILITER(S): at 16:30

## 2023-02-21 RX ADMIN — CHLORHEXIDINE GLUCONATE 15 MILLILITER(S): 213 SOLUTION TOPICAL at 20:45

## 2023-02-21 RX ADMIN — PROPOFOL 4.81 MICROGRAM(S)/KG/MIN: 10 INJECTION, EMULSION INTRAVENOUS at 15:23

## 2023-02-21 RX ADMIN — Medication 100 MILLIGRAM(S): at 17:46

## 2023-02-21 RX ADMIN — NICARDIPINE HYDROCHLORIDE 25 MG/HR: 30 CAPSULE, EXTENDED RELEASE ORAL at 15:23

## 2023-02-21 RX ADMIN — Medication 125 MILLILITER(S): at 20:51

## 2023-02-21 RX ADMIN — Medication 100 MILLIEQUIVALENT(S): at 15:23

## 2023-02-21 RX ADMIN — Medication 125 MILLILITER(S): at 17:48

## 2023-02-21 RX ADMIN — CHLORHEXIDINE GLUCONATE 1 APPLICATION(S): 213 SOLUTION TOPICAL at 06:14

## 2023-02-21 RX ADMIN — CHLORHEXIDINE GLUCONATE 15 MILLILITER(S): 213 SOLUTION TOPICAL at 06:13

## 2023-02-21 RX ADMIN — SODIUM CHLORIDE 10 MILLILITER(S): 9 INJECTION INTRAMUSCULAR; INTRAVENOUS; SUBCUTANEOUS at 14:15

## 2023-02-21 RX ADMIN — CHLORHEXIDINE GLUCONATE 1 APPLICATION(S): 213 SOLUTION TOPICAL at 20:48

## 2023-02-21 RX ADMIN — MILRINONE LACTATE 9.02 MICROGRAM(S)/KG/MIN: 1 INJECTION, SOLUTION INTRAVENOUS at 14:12

## 2023-02-21 RX ADMIN — Medication 166.67 MILLIGRAM(S): at 21:34

## 2023-02-21 RX ADMIN — SODIUM CHLORIDE 1000 MILLILITER(S): 9 INJECTION, SOLUTION INTRAVENOUS at 21:33

## 2023-02-21 RX ADMIN — Medication 125 MILLILITER(S): at 17:49

## 2023-02-21 RX ADMIN — SODIUM CHLORIDE 5 MILLILITER(S): 9 INJECTION INTRAMUSCULAR; INTRAVENOUS; SUBCUTANEOUS at 14:10

## 2023-02-21 RX ADMIN — INSULIN HUMAN 4 UNIT(S)/HR: 100 INJECTION, SOLUTION SUBCUTANEOUS at 20:44

## 2023-02-21 RX ADMIN — PANTOPRAZOLE SODIUM 40 MILLIGRAM(S): 20 TABLET, DELAYED RELEASE ORAL at 16:32

## 2023-02-21 NOTE — CHART NOTE - NSCHARTNOTEFT_GEN_A_CORE
As per Dr. Seo, anticoagulation and chemical DVT ppx is contraindicated at this time due to risk of GI bleed in setting of recent bowel movements with bright red blood. Mechanical DVT ppx only at this time.

## 2023-02-21 NOTE — PATIENT PROFILE ADULT - FALL HARM RISK - UNIVERSAL INTERVENTIONS
Bed in lowest position, wheels locked, appropriate side rails in place/Call bell, personal items and telephone in reach/Instruct patient to call for assistance before getting out of bed or chair/Non-slip footwear when patient is out of bed/[place] to call system/Physically safe environment - no spills, clutter or unnecessary equipment/Purposeful Proactive Rounding/Room/bathroom lighting operational, light cord in reach

## 2023-02-21 NOTE — CHART NOTE - NSCHARTNOTEFT_GEN_A_CORE
Cardiac Surgery Pre-op Note:                                                                                                                  Surgeon: Gabbi    Procedure: CABG     Allergies    No Known Allergies    Intolerances        HPI:  76 yo male with PMHs of HTN,  HLD,  DM presented to ED with c/o L arm numbness, abnormal movement since this morning. In ED CODE Stroke called. CT head neg for acute infarct.   Labs significant for elevated troponin 1.5 and abnormal EKG. Pt reported c/o chest pressure on exertion for the past couple of weeks.   He denies any headache, fever, dizziness, SOB, palpitations, nausea, vomiting, abdominal pain, change in urinary or bowel habits.     During my encounter, pt had no chest pain and was on his way to cath lab for cardiac cauterization    (2023 16:30)      PAST MEDICAL & SURGICAL HISTORY:  HTN (hypertension)      HLD (hyperlipidemia)      Post traumatic stress disorder (PTSD)          MEDICATIONS  (STANDING):  aspirin enteric coated 81 milliGRAM(s) Oral daily  atorvastatin 40 milliGRAM(s) Oral at bedtime  cefuroxime  IVPB 1500 milliGRAM(s) IV Intermittent once  chlorhexidine 0.12% Liquid 15 milliLiter(s) Swish and Spit two times a day  chlorhexidine 4% Liquid 1 Application(s) Topical daily  dextrose 50% Injectable 25 Gram(s) IV Push once  dextrose 50% Injectable 12.5 Gram(s) IV Push once  dextrose 50% Injectable 25 Gram(s) IV Push once  glucagon  Injectable 1 milliGRAM(s) IntraMuscular once  heparin  Infusion.  Unit(s)/Hr (9.5 mL/Hr) IV Continuous <Continuous>  insulin lispro (ADMELOG) corrective regimen sliding scale   SubCutaneous three times a day before meals  melatonin 5 milliGRAM(s) Oral at bedtime  metoprolol tartrate 12.5 milliGRAM(s) Oral two times a day  pantoprazole    Tablet 40 milliGRAM(s) Oral before breakfast  sodium chloride 0.9% lock flush 3 milliLiter(s) IV Push every 8 hours  vancomycin  IVPB 1250 milliGRAM(s) IV Intermittent once    MEDICATIONS  (PRN):  dextrose Oral Gel 15 Gram(s) Oral once PRN Blood Glucose LESS THAN 70 milliGRAM(s)/deciliter        Labs:                        12.2   8.77  )-----------( 145      ( 2023 02:30 )             35.7     02-    136  |  101  |  17.3  ----------------------------<  171<H>  3.9   |  21.0<L>  |  0.82    Ca    9.4      2023 02:30    TPro  6.1<L>  /  Alb  3.6  /  TBili  0.4  /  DBili  x   /  AST  43<H>  /  ALT  18  /  AlkPhos  73      PT/INR - ( 2023 18:19 )   PT: 11.8 sec;   INR: 1.02 ratio         PTT - ( 2023 21:50 )  PTT:46.6 sec  LIVER FUNCTIONS - ( 2023 02:30 )  Alb: 3.6 g/dL / Pro: 6.1 g/dL / ALK PHOS: 73 U/L / ALT: 18 U/L / AST: 43 U/L / GGT: x           Urinalysis Basic - ( 2023 21:50 )    Color: Yellow / Appearance: Clear / S.010 / pH: x  Gluc: x / Ketone: Trace  / Bili: Negative / Urobili: Negative   Blood: x / Protein: Negative / Nitrite: Positive   Leuk Esterase: Trace / RBC: Negative /HPF / WBC 0-2 /HPF   Sq Epi: x / Non Sq Epi: Occasional / Bacteria: Occasional       T4, Serum: 7.5 ug/dL ( @ 02:30)  Thyroid Stimulating Hormone, Serum: 1.30 uIU/mL ( @ 02:30)  Prealbumin, Serum: 19 mg/dL ( 02:30)  Thyroid Stimulating Hormone, Serum: 1.45 uIU/mL ( @ 18:19)  Prealbumin, Serum: 22 mg/dL ( @ 18:19)    Prealbumin, Serum: 19 mg/dL ( @ 02:30)  Serum Pro-Brain Natriuretic Peptide: 1834 pg/mL ( @ 18:19)  Prealbumin, Serum: 22 mg/dL ( @ 18:19)        CXR:    EKG:  < from: 12 Lead ECG (23 @ 17:13) >      Ventricular Rate 63 BPM    Atrial Rate 63 BPM    P-R Interval 196 ms    QRS Duration 104 ms    Q-T Interval 390 ms    QTC Calculation(Bazett) 399 ms    P Axis 56 degrees    R Axis 46 degrees    T Axis 105 degrees    Diagnosis Line Normal sinus rhythm  Possible Left atrial enlargement  ST elevation consider inferior injury or acute infarct  ** ** ACUTE MI / STEMI ** **  Abnormal ECG    Confirmed by MACI THOMPSON ([id number]) on 2023 6:30:03 PM    < end of copied text >      Carotid Duplex:  < from: US Duplex Carotid Arteries Complete, Bilateral (23 @ 22:42) >    ACC: [id number] EXAM:  US DPLX CAROTIDS COMPL BI   ORDERED BY: ALISHA CUTLER     PROCEDURE DATE:  2023          INTERPRETATION:  CLINICAL INFORMATION: Preop open heart surgery    COMPARISON: CT angiography from the same day    TECHNIQUE: Grayscale, color and spectral Doppler examination of both   carotid arteries was performed.    FINDINGS:    No elevated velocities are encountered. Balloon pump waveforms. Bilateral   intimal thickening with mild bilateral carotid bulb plaque.    Peak systolic velocities are as follows:    RIGHT:  PROX CCA = 68 cm/s  DIST CCA = 55 cm/s  PROX ICA = 46 cm/s  DIST ICA = 71 cm/s  ECA = 83 cm/s    LEFT:  PROX CCA = 70 cm/s  DIST CCA = 75 cm/s  PROX ICA = 55 cm/s  DIST ICA = 57 cm/s  ECA = 69 cm/s    Antegrade flow is noted within both vertebral arteries.    IMPRESSION: No significant hemodynamic stenosis of either carotid artery.    Measurement of carotid stenosis is based on velocity parameters that   correlate the residual internal carotid diameterwith that of the more   distal vessel in accordance with a method such as the [place] American   Symptomatic Carotid Endarterectomy Trial (NASCET).    --- End of Report ---            CARLOS RECINOS MD; Attending Radiologist  This document has been electronically signed. 2023 10:56PM    < end of copied text >    < from: TTE Echo Complete w/ Contrast w/ Doppler (23 @ 14:56) >    Summary:   1. Endocardial visualization was enhanced with intravenous echo contrast.   2. The left atrium is normal in size.   3. Global diffuse akinesis. Left ventricular ejection fraction, by   visual estimation, is 25 to 30%.   4. Sludge in the LV apex. No evidence of thrombus.   5. The right atrium is normal in size.   6. Normal right ventricular size and function.   7. Mild mitral valve regurgitation.   8. There is no evidence of pericardial effusion.    AzharSupariwala, MD, RPVI Electronically signed on 2023 at 6:22:35 PM            *** Final ***    < end of copied text >      PFT's:   FVC 2.30  FEV1 1.20  FEV1/FVC 0.52        Cath report:  < from: Cardiac Catheterization (23 @ 16:07) >    Diagnostic Findings:     Coronary Angiography   The coronary circulation is right dominant. Cardiac catheterization  was performed emergently.    LM   Left main artery: There is a 95 % stenosis. RICHARDSON Flow 3.      LAD   Proximal left anterior descending: There is a 90 % stenosis. RICHARDSON Flow  3.    CX   Proximal circumflex: There is a 70 % stenosis.      RCA   Proximal right coronary artery: There is an 80 % stenosis. Mid right  coronary artery: There is a 98 % stenosis. Distal right  coronary artery: There is a 100 % stenosis. Right posterior descending  artery: There is a 95 % stenosis.    Ramus   Ramus intermedius: There is an 80 % stenosis. RICHARDSON Flow 3.      < end of copied text >            Pt has AICD/PPm [ ] Yes  [x ] No             Brand Name:  Pre-op Beta Blocker ordered within 24 hrs of surgery?  [ x] Yes  [ ] No  If not, Why?  Pre-op Hibiclens & Peridex (BID x 2 days preferred) [ x] Yes  [ ] No  Type & Cross  [ x] Yes  [ ] No  NPO after Midnight [ x] Yes  [ ] No  Pre-op ABX ordered, to be taped on chart:  [x ] Yes  [ ] No   ( Vanco only if Anaphylaxis, or MRSA)  Consent obtained  [ ] Yes  [ ] No ( to be obtained by surgeon and anesthesia

## 2023-02-21 NOTE — PROGRESS NOTE ADULT - ASSESSMENT
77y Male PMHx of HTN, HLD, DM presented to ED with c/o L arm numbness, abnormal movement, code stroke called in ED, CT head neg for acute infarct. Labs significant for elevated troponin 1.5. Pt endorses slight constant chest pressure. States over past month have noticed the pressure more when walking dog. EKG concerning for anterior infarct. Pt given plavix 300mg given and asa, pt now s/p LHC and found to have Severe LM 95 %, LAD 90 %, pCir 70 %, pRCA 80 % mid right 98%, distal right 100% RPDA 95 % Ramus 80 %, Right jennifer IABP placed in the cath lab and patient started on Heparin gtt. Pt remains hemodynamically stable in CT ICU. Neurology Clearance obtained for cardiac surgery. Repeat P2Y12 in the AM, tentatively going to OR this AM 2/21.

## 2023-02-21 NOTE — BRIEF OPERATIVE NOTE - NSICDXBRIEFPROCEDURE_GEN_ALL_CORE_FT
PROCEDURES:  CABG, with saphenous vein graft 21-Feb-2023 13:17:49 C3L (lima to lad svg to om2 svg to pl with endoscopic right greater saphenous vein harvesting) Francisca Gifford

## 2023-02-21 NOTE — PROCEDURE NOTE - NSANTIMICROB_VASC_A_CORE
Problem: Mobility Impaired (Adult and Pediatric)  Goal: *Acute Goals and Plan of Care (Insert Text)  Description: Physical Therapy Goals   Initiated 10/16/2021 and to be accomplished within 1-2 day(s)  Pt will be able to perform supine<>sit SBA for home performance at MA. Pt will be able to perform sit<>stand SBA for increased ability to transfer at home safely. Pt will be able to participate in gt training >50' w/RW, WBAT, GB and CGA/SBA for improved functional mobility at d/c. Pt will be able to perform stair training 3 -5 steps using step to pattern, HR rail and CGA for safe home entry at d/c. (Deferred at this time)  Pt will be educated regarding HEP per MD protocol for optimal AROM/strength outcomes. Outcome: Progressing Towards Goal    [x]  Patient has met MD mobilization critieria for d/c home   [x]  Recommend HH with 24 hour adult care   []  Benefit from additional acute PT session to address:      PHYSICAL THERAPY EVALUATION    Patient: Gladis Mullen (59 y.o. female)  Date: 10/16/2021  Primary Diagnosis: Total knee replacement status, left [Z96.652]  Procedure(s) (LRB):  LEFT TOTAL KNEE ARTHROPLASTY AND ALL INDICATED PROCEDURES (Left) 1 Day Post-Op   Precautions:  Fall, WBAT  WBAT  PLOF: amb independently with knee brace L knee PTA    ASSESSMENT :  Based on the objective data described below, the patient presents with decreased ROM/motor performance LLE, decrease functional mobility with regard to bed mobility, transfers, and gait following L TKR. Reports pain 4-5/10 pre, 6-7/10 post session. Pt found up in chair at bedside on PT arrival.  demonstrates transfers STS with RW/SBA/vc for hand placement. Able to participate in GT/RW/SBA  80 ft. Tracy slow, antalgic with step to gt pattern. Stair nego reviewed verbally and pt recites accurate sequencing ascending, verbal reminder for descending. Also educated in and demonstrates HEP accurately for supine AP/AC/HS/QS.   Pt returned to bed with SBA, self assisting L LE onto bed. Left with SCD's/ice in place, all needs in reach and nurse [facility] notified of above and of pt request for Tylenol. Recommend HHPT for follow up physical therapy upon discharge to reach maximal level of independence/safety with functional mobility. Patient education: Mobility safety, WB, HEP, ice application/use, elevation, environmental safety and home safety techniques. Patient will benefit from skilled intervention to address the above impairments. Patient's rehabilitation potential is considered to be Good  Factors which may influence rehabilitation potential include:   []         None noted  []         Mental ability/status  []         Medical condition  []         Home/family situation and support systems  [x]         Safety awareness reminded to have staff supervision for amb'g  [x]         Pain tolerance/management  []         Other:      PLAN :  Recommendations and Planned Interventions:   [x]           Bed Mobility Training             []    Neuromuscular Re-Education  [x]           Transfer Training                   []    Orthotic/Prosthetic Training  [x]           Gait Training                          []    Modalities  [x]           Therapeutic Exercises           [x]    Edema Management/Control  [x]           Therapeutic Activities            [x]    Family Training/Education  [x]           Patient Education  []           Other (comment):    Frequency/Duration: Patient will be followed by physical therapy 1-2 times per day/4-7 days per week to address goals. Discharge Recommendations: Home Physical Therapy  Further Equipment Recommendations for Discharge: N/A     SUBJECTIVE:   Patient stated Pancho Bobo.     OBJECTIVE DATA SUMMARY:     Past Medical History:   Diagnosis Date    Arthritis     Psychiatric disorder      Past Surgical History:   Procedure Laterality Date    HX CHOLECYSTECTOMY      HX HYSTERECTOMY       Barriers to Learning/Limitations: None  Compensate with: N/A  Home Situation:  Home Situation  Home Environment: Private residence  # Steps to Enter: 8  Rails to Enter: Yes  Hand Rails : Bilateral  One/Two Story Residence: Two story, live on 1st floor  Lift Chair Available: No  Living Alone: No  Support Systems: Spouse/Significant Other  Patient Expects to be Discharged to[de-identified] House  Current DME Used/Available at Home: Walker, rollator, Walker, rolling, Jonita Seth, straight ([employer])  Tub or Shower Type: Tub/Shower combination  Critical Behavior:  Neurologic State: Alert; Appropriate for age  Orientation Level: Oriented X4  Cognition: Follows commands; Appropriate safety awareness; Appropriate for age attention/concentration; Appropriate decision making  Safety/Judgement: Awareness of environment; Fall prevention  Psychosocial  Patient Behaviors: Calm; Cooperative  Skin Integrity: Incision (comment) (Mepelix drsg c/d/i L ant knee)  Skin Integumentary  Skin Integrity: Incision (comment) (Mepelix drsg c/d/i L ant knee)  Strength:    Strength: Generally decreased, functional  Tone & Sensation:   Sensation: Intact  Range Of Motion:  AROM: Generally decreased, functional  Functional Mobility:  Bed Mobility:  Sit to Supine: Stand-by assistance  Scooting: Stand-by assistance;Supervision  Transfers:  Sit to Stand: Stand-by assistance (vc for hand placement)  Stand to Sit: Stand-by assistance (as above and for L foot placement)  Balance:   Sitting: Intact  Sitting - Static: Good (unsupported)  Sitting - Dynamic: Good (unsupported)  Standing: Intact; With support  Standing - Static: Good  Standing - Dynamic : Fair  Ambulation/Gait Training:  Distance (ft): 80 Feet (ft)  Assistive Device: Gait belt;Walker, rolling  Ambulation - Level of Assistance: Stand-by assistance  Gait Description (WDL): Exceptions to WDL  Gait Abnormalities: Antalgic;Decreased step clearance; Step to gait  Left Side Weight Bearing: As tolerated  Base of Support: Shift to right  Stance: Left decreased  Speed/Tracy: Slow  Step Length: Right shortened;Left shortened  Swing Pattern: Right asymmetrical;Left asymmetrical  Interventions: Safety awareness training;Verbal cues; Visual/Demos  Pain:  Pain level pre-treatment: 4-5/10   Pain level post-treatment: 6-7/10   Pain Intervention(s) : Medication (see MAR); Rest, Ice, Repositioning  Response to intervention: Nurse notified, See doc flow  Activity Tolerance:   Fair   Please refer to the flowsheet for vital signs taken during this treatment. After treatment:   []         Patient left in no apparent distress sitting up in chair  [x]         Patient left in no apparent distress in bed  [x]         Call bell left within reach  [x]         Nursing notified  []         Caregiver present  []         Bed alarm activated  [x]         SCDs applied    COMMUNICATION/EDUCATION:   [x]         Role of Physical Therapy in the acute care setting. [x]         Fall prevention education was provided and the patient/caregiver indicated understanding. [x]         Patient/family have participated as able in goal setting and plan of care. [x]         Patient/family agree to work toward stated goals and plan of care. []         Patient understands intent and goals of therapy, but is neutral about his/her participation. []         Patient is unable to participate in goal setting/plan of care: ongoing with therapy staff.  []         Other:     Thank you for this referral.  Juan M Dhaliwal, PT   Time Calculation: 32 mins      Eval Complexity: History: MEDIUM  Complexity : 1-2 comorbidities / personal factors will impact the outcome/ POC Exam:MEDIUM Complexity : 3 Standardized tests and measures addressing body structure, function, activity limitation and / or participation in recreation  Presentation: LOW Complexity : Stable, uncomplicated  Clinical Decision Making:Medium Complexity    Overall Complexity:MEDIUM No

## 2023-02-22 DIAGNOSIS — I25.5 ISCHEMIC CARDIOMYOPATHY: ICD-10-CM

## 2023-02-22 LAB
ALBUMIN SERPL ELPH-MCNC: 3.9 G/DL — SIGNIFICANT CHANGE UP (ref 3.3–5.2)
ALP SERPL-CCNC: 53 U/L — SIGNIFICANT CHANGE UP (ref 40–120)
ALT FLD-CCNC: 13 U/L — SIGNIFICANT CHANGE UP
ANION GAP SERPL CALC-SCNC: 12 MMOL/L — SIGNIFICANT CHANGE UP (ref 5–17)
APTT BLD: 26.8 SEC — LOW (ref 27.5–35.5)
AST SERPL-CCNC: 42 U/L — HIGH
BILIRUB SERPL-MCNC: 0.5 MG/DL — SIGNIFICANT CHANGE UP (ref 0.4–2)
BUN SERPL-MCNC: 15.3 MG/DL — SIGNIFICANT CHANGE UP (ref 8–20)
CALCIUM SERPL-MCNC: 8.6 MG/DL — SIGNIFICANT CHANGE UP (ref 8.4–10.5)
CHLORIDE SERPL-SCNC: 105 MMOL/L — SIGNIFICANT CHANGE UP (ref 96–108)
CK MB CFR SERPL CALC: 11 NG/ML — HIGH (ref 0–6.7)
CK SERPL-CCNC: 343 U/L — HIGH (ref 30–200)
CO2 SERPL-SCNC: 23 MMOL/L — SIGNIFICANT CHANGE UP (ref 22–29)
CREAT SERPL-MCNC: 0.81 MG/DL — SIGNIFICANT CHANGE UP (ref 0.5–1.3)
EGFR: 91 ML/MIN/1.73M2 — SIGNIFICANT CHANGE UP
GAS PNL BLDA: SIGNIFICANT CHANGE UP
GLUCOSE BLDC GLUCOMTR-MCNC: 116 MG/DL — HIGH (ref 70–99)
GLUCOSE BLDC GLUCOMTR-MCNC: 117 MG/DL — HIGH (ref 70–99)
GLUCOSE BLDC GLUCOMTR-MCNC: 118 MG/DL — HIGH (ref 70–99)
GLUCOSE BLDC GLUCOMTR-MCNC: 120 MG/DL — HIGH (ref 70–99)
GLUCOSE BLDC GLUCOMTR-MCNC: 124 MG/DL — HIGH (ref 70–99)
GLUCOSE BLDC GLUCOMTR-MCNC: 125 MG/DL — HIGH (ref 70–99)
GLUCOSE BLDC GLUCOMTR-MCNC: 133 MG/DL — HIGH (ref 70–99)
GLUCOSE BLDC GLUCOMTR-MCNC: 136 MG/DL — HIGH (ref 70–99)
GLUCOSE BLDC GLUCOMTR-MCNC: 138 MG/DL — HIGH (ref 70–99)
GLUCOSE BLDC GLUCOMTR-MCNC: 145 MG/DL — HIGH (ref 70–99)
GLUCOSE BLDC GLUCOMTR-MCNC: 145 MG/DL — HIGH (ref 70–99)
GLUCOSE BLDC GLUCOMTR-MCNC: 150 MG/DL — HIGH (ref 70–99)
GLUCOSE BLDC GLUCOMTR-MCNC: 152 MG/DL — HIGH (ref 70–99)
GLUCOSE BLDC GLUCOMTR-MCNC: 152 MG/DL — HIGH (ref 70–99)
GLUCOSE BLDC GLUCOMTR-MCNC: 161 MG/DL — HIGH (ref 70–99)
GLUCOSE SERPL-MCNC: 112 MG/DL — HIGH (ref 70–99)
HCT VFR BLD CALC: 25.2 % — LOW (ref 39–50)
HGB BLD-MCNC: 8.5 G/DL — LOW (ref 13–17)
INR BLD: 1.17 RATIO — HIGH (ref 0.88–1.16)
MAGNESIUM SERPL-MCNC: 1.9 MG/DL — SIGNIFICANT CHANGE UP (ref 1.8–2.6)
MCHC RBC-ENTMCNC: 30 PG — SIGNIFICANT CHANGE UP (ref 27–34)
MCHC RBC-ENTMCNC: 33.7 GM/DL — SIGNIFICANT CHANGE UP (ref 32–36)
MCV RBC AUTO: 89 FL — SIGNIFICANT CHANGE UP (ref 80–100)
PLATELET # BLD AUTO: 151 K/UL — SIGNIFICANT CHANGE UP (ref 150–400)
POTASSIUM SERPL-MCNC: 3.9 MMOL/L — SIGNIFICANT CHANGE UP (ref 3.5–5.3)
POTASSIUM SERPL-SCNC: 3.9 MMOL/L — SIGNIFICANT CHANGE UP (ref 3.5–5.3)
PROT SERPL-MCNC: 5.7 G/DL — LOW (ref 6.6–8.7)
PROTHROM AB SERPL-ACNC: 13.6 SEC — HIGH (ref 10.5–13.4)
RBC # BLD: 2.83 M/UL — LOW (ref 4.2–5.8)
RBC # FLD: 13.1 % — SIGNIFICANT CHANGE UP (ref 10.3–14.5)
SODIUM SERPL-SCNC: 140 MMOL/L — SIGNIFICANT CHANGE UP (ref 135–145)
TROPONIN T SERPL-MCNC: 1.4 NG/ML — HIGH (ref 0–0.06)
WBC # BLD: 10.72 K/UL — HIGH (ref 3.8–10.5)
WBC # FLD AUTO: 10.72 K/UL — HIGH (ref 3.8–10.5)

## 2023-02-22 PROCEDURE — 99232 SBSQ HOSP IP/OBS MODERATE 35: CPT

## 2023-02-22 PROCEDURE — 99024 POSTOP FOLLOW-UP VISIT: CPT

## 2023-02-22 PROCEDURE — 99291 CRITICAL CARE FIRST HOUR: CPT

## 2023-02-22 PROCEDURE — 71045 X-RAY EXAM CHEST 1 VIEW: CPT | Mod: 26

## 2023-02-22 PROCEDURE — 93010 ELECTROCARDIOGRAM REPORT: CPT

## 2023-02-22 PROCEDURE — 99292 CRITICAL CARE ADDL 30 MIN: CPT

## 2023-02-22 PROCEDURE — 99222 1ST HOSP IP/OBS MODERATE 55: CPT

## 2023-02-22 RX ORDER — OXYCODONE HYDROCHLORIDE 5 MG/1
10 TABLET ORAL EVERY 4 HOURS
Refills: 0 | Status: DISCONTINUED | OUTPATIENT
Start: 2023-02-22 | End: 2023-02-27

## 2023-02-22 RX ORDER — HYDROMORPHONE HYDROCHLORIDE 2 MG/ML
0.25 INJECTION INTRAMUSCULAR; INTRAVENOUS; SUBCUTANEOUS ONCE
Refills: 0 | Status: DISCONTINUED | OUTPATIENT
Start: 2023-02-22 | End: 2023-02-22

## 2023-02-22 RX ORDER — MAGNESIUM SULFATE 500 MG/ML
2 VIAL (ML) INJECTION ONCE
Refills: 0 | Status: COMPLETED | OUTPATIENT
Start: 2023-02-22 | End: 2023-02-22

## 2023-02-22 RX ORDER — INSULIN LISPRO 100/ML
2 VIAL (ML) SUBCUTANEOUS
Refills: 0 | Status: DISCONTINUED | OUTPATIENT
Start: 2023-02-22 | End: 2023-02-25

## 2023-02-22 RX ORDER — HYDROMORPHONE HYDROCHLORIDE 2 MG/ML
0.5 INJECTION INTRAMUSCULAR; INTRAVENOUS; SUBCUTANEOUS
Refills: 0 | Status: DISCONTINUED | OUTPATIENT
Start: 2023-02-22 | End: 2023-02-22

## 2023-02-22 RX ORDER — METOPROLOL TARTRATE 50 MG
25 TABLET ORAL
Refills: 0 | Status: DISCONTINUED | OUTPATIENT
Start: 2023-02-22 | End: 2023-02-27

## 2023-02-22 RX ORDER — METOCLOPRAMIDE HCL 10 MG
10 TABLET ORAL ONCE
Refills: 0 | Status: COMPLETED | OUTPATIENT
Start: 2023-02-22 | End: 2023-02-22

## 2023-02-22 RX ORDER — ACETAMINOPHEN 500 MG
975 TABLET ORAL EVERY 6 HOURS
Refills: 0 | Status: COMPLETED | OUTPATIENT
Start: 2023-02-22 | End: 2023-02-23

## 2023-02-22 RX ORDER — OXYCODONE HYDROCHLORIDE 5 MG/1
5 TABLET ORAL EVERY 4 HOURS
Refills: 0 | Status: DISCONTINUED | OUTPATIENT
Start: 2023-02-22 | End: 2023-02-27

## 2023-02-22 RX ORDER — ACETAMINOPHEN 500 MG
650 TABLET ORAL EVERY 6 HOURS
Refills: 0 | Status: DISCONTINUED | OUTPATIENT
Start: 2023-02-23 | End: 2023-02-27

## 2023-02-22 RX ORDER — POTASSIUM CHLORIDE 20 MEQ
10 PACKET (EA) ORAL
Refills: 0 | Status: COMPLETED | OUTPATIENT
Start: 2023-02-22 | End: 2023-02-22

## 2023-02-22 RX ORDER — ALBUMIN HUMAN 25 %
250 VIAL (ML) INTRAVENOUS ONCE
Refills: 0 | Status: COMPLETED | OUTPATIENT
Start: 2023-02-22 | End: 2023-02-22

## 2023-02-22 RX ORDER — ENOXAPARIN SODIUM 100 MG/ML
40 INJECTION SUBCUTANEOUS EVERY 24 HOURS
Refills: 0 | Status: DISCONTINUED | OUTPATIENT
Start: 2023-02-22 | End: 2023-02-27

## 2023-02-22 RX ORDER — LIDOCAINE 4 G/100G
1 CREAM TOPICAL DAILY
Refills: 0 | Status: DISCONTINUED | OUTPATIENT
Start: 2023-02-22 | End: 2023-02-27

## 2023-02-22 RX ADMIN — Medication 25 GRAM(S): at 04:30

## 2023-02-22 RX ADMIN — Medication 166.67 MILLIGRAM(S): at 21:58

## 2023-02-22 RX ADMIN — CHLORHEXIDINE GLUCONATE 1 APPLICATION(S): 213 SOLUTION TOPICAL at 18:03

## 2023-02-22 RX ADMIN — ATORVASTATIN CALCIUM 80 MILLIGRAM(S): 80 TABLET, FILM COATED ORAL at 21:59

## 2023-02-22 RX ADMIN — Medication 100 MILLIEQUIVALENT(S): at 02:08

## 2023-02-22 RX ADMIN — SENNA PLUS 2 TABLET(S): 8.6 TABLET ORAL at 21:59

## 2023-02-22 RX ADMIN — Medication 81 MILLIGRAM(S): at 12:25

## 2023-02-22 RX ADMIN — OXYCODONE HYDROCHLORIDE 5 MILLIGRAM(S): 5 TABLET ORAL at 17:40

## 2023-02-22 RX ADMIN — Medication 25 MILLIGRAM(S): at 18:02

## 2023-02-22 RX ADMIN — Medication 975 MILLIGRAM(S): at 18:03

## 2023-02-22 RX ADMIN — DULOXETINE HYDROCHLORIDE 60 MILLIGRAM(S): 30 CAPSULE, DELAYED RELEASE ORAL at 12:25

## 2023-02-22 RX ADMIN — Medication 100 MILLIEQUIVALENT(S): at 03:12

## 2023-02-22 RX ADMIN — POLYETHYLENE GLYCOL 3350 17 GRAM(S): 17 POWDER, FOR SOLUTION ORAL at 12:24

## 2023-02-22 RX ADMIN — Medication 2 UNIT(S): at 12:24

## 2023-02-22 RX ADMIN — HYDROMORPHONE HYDROCHLORIDE 0.5 MILLIGRAM(S): 2 INJECTION INTRAMUSCULAR; INTRAVENOUS; SUBCUTANEOUS at 05:55

## 2023-02-22 RX ADMIN — HYDROMORPHONE HYDROCHLORIDE 0.5 MILLIGRAM(S): 2 INJECTION INTRAMUSCULAR; INTRAVENOUS; SUBCUTANEOUS at 11:16

## 2023-02-22 RX ADMIN — HYDROMORPHONE HYDROCHLORIDE 0.5 MILLIGRAM(S): 2 INJECTION INTRAMUSCULAR; INTRAVENOUS; SUBCUTANEOUS at 11:01

## 2023-02-22 RX ADMIN — Medication 2 UNIT(S): at 18:02

## 2023-02-22 RX ADMIN — OXYCODONE HYDROCHLORIDE 5 MILLIGRAM(S): 5 TABLET ORAL at 16:40

## 2023-02-22 RX ADMIN — Medication 1000 MILLIGRAM(S): at 01:52

## 2023-02-22 RX ADMIN — Medication 100 MILLIEQUIVALENT(S): at 00:30

## 2023-02-22 RX ADMIN — HYDROMORPHONE HYDROCHLORIDE 0.5 MILLIGRAM(S): 2 INJECTION INTRAMUSCULAR; INTRAVENOUS; SUBCUTANEOUS at 05:40

## 2023-02-22 RX ADMIN — Medication 10 MILLIGRAM(S): at 22:01

## 2023-02-22 RX ADMIN — Medication 100 MILLIGRAM(S): at 02:08

## 2023-02-22 RX ADMIN — Medication 125 MILLILITER(S): at 21:30

## 2023-02-22 RX ADMIN — HYDROMORPHONE HYDROCHLORIDE 0.25 MILLIGRAM(S): 2 INJECTION INTRAMUSCULAR; INTRAVENOUS; SUBCUTANEOUS at 04:15

## 2023-02-22 RX ADMIN — PANTOPRAZOLE SODIUM 40 MILLIGRAM(S): 20 TABLET, DELAYED RELEASE ORAL at 12:25

## 2023-02-22 RX ADMIN — Medication 975 MILLIGRAM(S): at 13:25

## 2023-02-22 RX ADMIN — Medication 975 MILLIGRAM(S): at 12:25

## 2023-02-22 RX ADMIN — Medication 100 MILLIGRAM(S): at 08:45

## 2023-02-22 RX ADMIN — Medication 166.67 MILLIGRAM(S): at 10:07

## 2023-02-22 RX ADMIN — Medication 975 MILLIGRAM(S): at 19:03

## 2023-02-22 RX ADMIN — Medication 400 MILLIGRAM(S): at 01:22

## 2023-02-22 RX ADMIN — HYDROMORPHONE HYDROCHLORIDE 0.25 MILLIGRAM(S): 2 INJECTION INTRAMUSCULAR; INTRAVENOUS; SUBCUTANEOUS at 04:00

## 2023-02-22 RX ADMIN — Medication 100 MILLIGRAM(S): at 17:17

## 2023-02-22 RX ADMIN — ENOXAPARIN SODIUM 40 MILLIGRAM(S): 100 INJECTION SUBCUTANEOUS at 12:25

## 2023-02-22 RX ADMIN — Medication 125 MILLILITER(S): at 16:39

## 2023-02-22 NOTE — DIETITIAN INITIAL EVALUATION ADULT - NS FNS DIET ORDER
Diet, Clear Liquid:   Consistent Carbohydrate {No Snacks} (CSTCHO)  DASH/TLC {Sodium & Cholesterol Restricted} (DASH) (02-22-23 @ 05:31)  Diet, Regular:   Consistent Carbohydrate {No Snacks} (CSTCHO)  DASH/TLC {Sodium & Cholesterol Restricted} (DASH) (02-21-23 @ 10:27)

## 2023-02-22 NOTE — DIETITIAN INITIAL EVALUATION ADULT - ORAL INTAKE PTA/DIET HISTORY
Pt reports eating well PTA, denies any recent weight changes. Pt denies difficulty swallowing post-op; however finds it hard to eat laying down (IABP remains in place). Pt not appropriate for diet education at this time.

## 2023-02-22 NOTE — PROGRESS NOTE ADULT - ASSESSMENT
77y Male with chest pain and left hand/arm weakness.     Left hand weakness.   Distribution suggests radial nerve palsy.   Cannot do MRI secondary to shrapnel fragments.   Will have OT/PT evaluations   Likely will need outpatient PT on discharge.     Chest pain.   Now status post coronary artery bypass grafting.  Post op care per CICU.

## 2023-02-22 NOTE — DIETITIAN INITIAL EVALUATION ADULT - PERTINENT MEDS FT
MEDICATIONS  (STANDING):  acetaminophen     Tablet .. 975 milliGRAM(s) Oral every 6 hours  aspirin enteric coated 81 milliGRAM(s) Oral daily  atorvastatin 80 milliGRAM(s) Oral at bedtime  cefuroxime  IVPB 1500 milliGRAM(s) IV Intermittent every 8 hours  chlorhexidine 2% Cloths 1 Application(s) Topical two times a day  dextrose 50% Injectable 50 milliLiter(s) IV Push every 15 minutes  dextrose 50% Injectable 25 milliLiter(s) IV Push every 15 minutes  dextrose 50% Injectable 50 milliLiter(s) IV Push every 15 minutes  dextrose 50% Injectable 25 milliLiter(s) IV Push every 15 minutes  DULoxetine 60 milliGRAM(s) Oral daily  enoxaparin Injectable 40 milliGRAM(s) SubCutaneous every 24 hours  insulin lispro Injectable (ADMELOG) 2 Unit(s) SubCutaneous three times a day before meals  insulin regular Infusion 4 Unit(s)/Hr (4 mL/Hr) IV Continuous <Continuous>  lidocaine   4% Patch 1 Patch Transdermal daily  milrinone Infusion 0.2 MICROgram(s)/kG/Min (4.81 mL/Hr) IV Continuous <Continuous>  pantoprazole    Tablet 40 milliGRAM(s) Oral daily  polyethylene glycol 3350 17 Gram(s) Oral daily  senna 2 Tablet(s) Oral at bedtime  sodium chloride 0.9%. 1000 milliLiter(s) (10 mL/Hr) IV Continuous <Continuous>  sodium chloride 0.9%. 1000 milliLiter(s) (5 mL/Hr) IV Continuous <Continuous>  vancomycin  IVPB 1250 milliGRAM(s) IV Intermittent every 12 hours    MEDICATIONS  (PRN):  albumin human  5% IVPB 250 milliLiter(s) IV Intermittent every 30 minutes PRN hypovolemia  HYDROmorphone  Injectable 0.5 milliGRAM(s) IV Push every 3 hours PRN break through  ondansetron Injectable 4 milliGRAM(s) IV Push every 4 hours PRN Nausea and/or Vomiting  oxyCODONE    IR 10 milliGRAM(s) Oral every 4 hours PRN Severe Pain (7 - 10)  oxyCODONE    IR 5 milliGRAM(s) Oral every 4 hours PRN Moderate Pain (4 - 6)

## 2023-02-22 NOTE — AIRWAY REMOVAL NOTE  ADULT & PEDS - ARTIFICAL AIRWAY REMOVAL COMMENTS
Patient has been extubated and tolerating it well with no respiratory distress noted. we will continue to monitor.

## 2023-02-22 NOTE — CONSULT NOTE ADULT - ASSESSMENT
DM type 2, stable control acutely and chronically. Consider resumption of metformin when stable. May not need insulin at home.  Will follow

## 2023-02-22 NOTE — DIETITIAN INITIAL EVALUATION ADULT - OTHER INFO
Pt is a 77 year old Male PMHx of HTN, HLD, DM presented to ED with c/o L arm numbness, abnormal movement, code stroke called in ED, CT head neg for acute infarct. Labs significant for elevated troponin 1.5. Pt endorses slight constant chest pressure. States over past month have noticed the pressure more when walking dog. EKG concerning for anterior infarct. Pt given plavix 300mg given and asa, pt now s/p LHC and found to have Severe LM 95 %, LAD 90 %, pCir 70 %, pRCA 80 % mid right 98%, distal right 100% RPDA 95 % Ramus 80 %, Right jennifer IABP placed in the cath lab and patient started on Heparin gtt. Pt remains hemodynamically stable in CT ICU. Neurology Clearance obtained for cardiac surgery. Pt now s/p C3 L with Dr. Seo

## 2023-02-22 NOTE — DIETITIAN INITIAL EVALUATION ADULT - PERTINENT LABORATORY DATA
02-22    140  |  105  |  15.3  ----------------------------<  112<H>  3.9   |  23.0  |  0.81    Ca    8.6      22 Feb 2023 02:10  Mg     1.9     02-22    TPro  5.7<L>  /  Alb  3.9  /  TBili  0.5  /  DBili  x   /  AST  42<H>  /  ALT  13  /  AlkPhos  53  02-22  POCT Blood Glucose.: 145 mg/dL (02-22-23 @ 10:04)  A1C with Estimated Average Glucose Result: 7.2 % (02-20-23 @ 18:19)

## 2023-02-22 NOTE — PROGRESS NOTE ADULT - ASSESSMENT
77 /o M HTN HLD DM presented to ED with c/o L arm numbness, abnormal movement, code stroke called in ED, CT head neg for acute infarct. Labs significant for elevated troponin 1.5. Pt endorses slight constant chest pressure. States over past month have noticed the pressure more when walking dog. EKG concerning for anterior infarct.

## 2023-02-22 NOTE — CONSULT NOTE ADULT - SUBJECTIVE AND OBJECTIVE BOX
HPI:  78 yo male with PMHs of HTN,  HLD,  DM presented to ED with c/o L arm numbness, abnormal movement since this morning. In ED CODE Stroke called. CT head neg for acute infarct.   Labs significant for elevated troponin 1.5 and abnormal EKG. Pt reported c/o chest pressure on exertion for the past couple of weeks.   He denies any headache, fever, dizziness, SOB, palpitations, nausea, vomiting, abdominal pain, change in urinary or bowel habits.     During my encounter, pt had no chest pain and was on his way to cath lab for cardiac cauterization    (2023 16:30)  Pt. seen s/p CABG. History of type 2 diabetes for 10 years, controlled on metformin and alogliptin, without adverse effects or complications.  A1C 7.2. Pt. currently well controlled on IV insulin at 1.5 units/hour      PAST MEDICAL & SURGICAL HISTORY:  HTN (hypertension)      HLD (hyperlipidemia)      Post traumatic stress disorder (PTSD)            MEDICATIONS  (STANDING):  acetaminophen     Tablet .. 975 milliGRAM(s) Oral every 6 hours  aspirin enteric coated 81 milliGRAM(s) Oral daily  atorvastatin 80 milliGRAM(s) Oral at bedtime  cefuroxime  IVPB 1500 milliGRAM(s) IV Intermittent every 8 hours  chlorhexidine 2% Cloths 1 Application(s) Topical two times a day  dextrose 50% Injectable 50 milliLiter(s) IV Push every 15 minutes  dextrose 50% Injectable 25 milliLiter(s) IV Push every 15 minutes  dextrose 50% Injectable 50 milliLiter(s) IV Push every 15 minutes  dextrose 50% Injectable 25 milliLiter(s) IV Push every 15 minutes  DULoxetine 60 milliGRAM(s) Oral daily  enoxaparin Injectable 40 milliGRAM(s) SubCutaneous every 24 hours  insulin lispro Injectable (ADMELOG) 2 Unit(s) SubCutaneous three times a day before meals  insulin regular Infusion 4 Unit(s)/Hr (4 mL/Hr) IV Continuous <Continuous>  lidocaine   4% Patch 1 Patch Transdermal daily  milrinone Infusion 0.2 MICROgram(s)/kG/Min (4.81 mL/Hr) IV Continuous <Continuous>  pantoprazole    Tablet 40 milliGRAM(s) Oral daily  polyethylene glycol 3350 17 Gram(s) Oral daily  senna 2 Tablet(s) Oral at bedtime  sodium chloride 0.9%. 1000 milliLiter(s) (10 mL/Hr) IV Continuous <Continuous>  sodium chloride 0.9%. 1000 milliLiter(s) (5 mL/Hr) IV Continuous <Continuous>  vancomycin  IVPB 1250 milliGRAM(s) IV Intermittent every 12 hours    MEDICATIONS  (PRN):  albumin human  5% IVPB 250 milliLiter(s) IV Intermittent every 30 minutes PRN hypovolemia  ondansetron Injectable 4 milliGRAM(s) IV Push every 4 hours PRN Nausea and/or Vomiting  oxyCODONE    IR 10 milliGRAM(s) Oral every 4 hours PRN Severe Pain (7 - 10)  oxyCODONE    IR 5 milliGRAM(s) Oral every 4 hours PRN Moderate Pain (4 - 6)      Allergies    No Known Allergies    Intolerances          PHYSICAL EXAM:    Vital Signs Last 24 Hrs  T(C): 37.5 (2023 12:00), Max: 37.8 (2023 00:00)  T(F): 99.5 (2023 12:00), Max: 100 (2023 00:00)  HR: 83 (2023 15:00) (62 - 93)  BP: --  BP(mean): --  RR: 20 (2023 14:00) (10 - 20)  SpO2: 94% (2023 15:00) (92% - 100%)    Parameters below as of 2023 15:00  Patient On (Oxygen Delivery Method): room air        General appearance: Well developed, well nourished.    Eyes: Pupils equal. Left lid retraction    Neck: Trachea midline. No thyroid enlargement.    Lungs: Normal respiratory excursion. Lungs clear.    CV: Regular cardiac rhythm.     Musculoskeletal: No cyanosis, clubbing, or edema. No pedal lesions.    Skin: Warm and dry    Neuro: Cranial nerves intact. Normal motor function.     Psych: Normal affect, good judgement.            LABS:                        8.5    10.72 )-----------( 151      ( 2023 02:10 )             25.2     -    140  |  105  |  15.3  ----------------------------<  112<H>  3.9   |  23.0  |  0.81    Ca    8.6      2023 02:10  Mg     1.9         TPro  5.7<L>  /  Alb  3.9  /  TBili  0.5  /  DBili  x   /  AST  42<H>  /  ALT  13  /  AlkPhos  53      Urinalysis Basic - ( 2023 21:50 )    Color: Yellow / Appearance: Clear / S.010 / pH: x  Gluc: x / Ketone: Trace  / Bili: Negative / Urobili: Negative   Blood: x / Protein: Negative / Nitrite: Positive   Leuk Esterase: Trace / RBC: Negative /HPF / WBC 0-2 /HPF   Sq Epi: x / Non Sq Epi: Occasional / Bacteria: Occasional      LIVER FUNCTIONS - ( 2023 02:10 )  Alb: 3.9 g/dL / Pro: 5.7 g/dL / ALK PHOS: 53 U/L / ALT: 13 U/L / AST: 42 U/L / GGT: x             T4, Serum: 7.5 ug/dL ( @ 02:30)      POCT Blood Glucose.: 152 mg/dL (23 @ 14:01)  POCT Blood Glucose.: 152 mg/dL (23 @ 12:04)  POCT Blood Glucose.: 145 mg/dL (23 @ 10:04)  POCT Blood Glucose.: 133 mg/dL (23 @ 08:07)  POCT Blood Glucose.: 125 mg/dL (23 @ 06:51)  POCT Blood Glucose.: 124 mg/dL (23 @ 05:18)  POCT Blood Glucose.: 120 mg/dL (23 @ 04:18)  POCT Blood Glucose.: 118 mg/dL (23 @ 03:11)  POCT Blood Glucose.: 116 mg/dL (23 @ 02:13)  POCT Blood Glucose.: 117 mg/dL (23 @ 01:00)  POCT Blood Glucose.: 125 mg/dL (23 @ 23:15)  POCT Blood Glucose.: 138 mg/dL (23 @ 21:00)  POCT Blood Glucose.: 145 mg/dL (23 @ 18:43)  POCT Blood Glucose.: 127 mg/dL (23 @ 17:03)  POCT Blood Glucose.: 123 mg/dL (23 @ 15:53)  POCT Blood Glucose.: 120 mg/dL (23 @ 15:19)  POCT Blood Glucose.: 153 mg/dL (23 @ 07:40)  POCT Blood Glucose.: 144 mg/dL (23 @ 18:52)  POCT Blood Glucose.: 260 mg/dL (23 @ 12:17)

## 2023-02-22 NOTE — DIETITIAN INITIAL EVALUATION ADULT - NSFNSGIIOFT_GEN_A_CORE
02-21-23 @ 07:01  -  02-22-23 @ 07:00  --------------------------------------------------------  OUT:    Chest Tube (mL): 210 mL    Chest Tube (mL): 140 mL    Chest Tube (mL): 550 mL    Chest Tube (mL): 65 mL  Total OUT: 965 mL    Total NET: -965 mL      02-22-23 @ 07:01  -  02-22-23 @ 10:24  --------------------------------------------------------  OUT:    Chest Tube (mL): 20 mL    Chest Tube (mL): 50 mL    Chest Tube (mL): 30 mL    Chest Tube (mL): 20 mL  Total OUT: 120 mL    Total NET: -120 mL

## 2023-02-22 NOTE — PROGRESS NOTE ADULT - ASSESSMENT
77y Male PMHx of HTN, HLD, DM presented to ED with c/o L arm numbness, abnormal movement, code stroke called in ED, CT head neg for acute infarct. Labs significant for elevated troponin 1.5. Pt endorses slight constant chest pressure. States over past month have noticed the pressure more when walking dog. EKG concerning for anterior infarct. Pt given plavix 300mg given and asa, pt now s/p LHC and found to have Severe LM 95 %, LAD 90 %, pCir 70 %, pRCA 80 % mid right 98%, distal right 100% RPDA 95 % Ramus 80 %, Right jennifer IABP placed in the cath lab and patient started on Heparin gtt. Pt remains hemodynamically stable in CT ICU. Neurology Clearance obtained for cardiac surgery. Repeat P2Y12 in the AM, tentatively going to OR this AM 2/21.                      77y Male PMHx of HTN, HLD, DM presented to ED with c/o L arm numbness, abnormal movement, code stroke called in ED, CT head neg for acute infarct. Labs significant for elevated troponin 1.5. Pt endorses slight constant chest pressure. States over past month have noticed the pressure more when walking dog. EKG concerning for anterior infarct. Pt given plavix 300mg given and asa, pt now s/p LHC and found to have Severe LM 95 %, LAD 90 %, pCir 70 %, pRCA 80 % mid right 98%, distal right 100% RPDA 95 % Ramus 80 %, Right jennifer IABP placed in the cath lab and patient started on Heparin gtt. Pt remains hemodynamically stable in CT ICU. Neurology Clearance obtained for cardiac surgery. Pt now s/p C3 L with Dr. Seo

## 2023-02-23 LAB
ANION GAP SERPL CALC-SCNC: 10 MMOL/L — SIGNIFICANT CHANGE UP (ref 5–17)
BUN SERPL-MCNC: 21.4 MG/DL — HIGH (ref 8–20)
CALCIUM SERPL-MCNC: 8.1 MG/DL — LOW (ref 8.4–10.5)
CHLORIDE SERPL-SCNC: 107 MMOL/L — SIGNIFICANT CHANGE UP (ref 96–108)
CO2 SERPL-SCNC: 23 MMOL/L — SIGNIFICANT CHANGE UP (ref 22–29)
CREAT SERPL-MCNC: 0.79 MG/DL — SIGNIFICANT CHANGE UP (ref 0.5–1.3)
EGFR: 92 ML/MIN/1.73M2 — SIGNIFICANT CHANGE UP
GLUCOSE BLDC GLUCOMTR-MCNC: 118 MG/DL — HIGH (ref 70–99)
GLUCOSE BLDC GLUCOMTR-MCNC: 119 MG/DL — HIGH (ref 70–99)
GLUCOSE BLDC GLUCOMTR-MCNC: 132 MG/DL — HIGH (ref 70–99)
GLUCOSE BLDC GLUCOMTR-MCNC: 135 MG/DL — HIGH (ref 70–99)
GLUCOSE BLDC GLUCOMTR-MCNC: 147 MG/DL — HIGH (ref 70–99)
GLUCOSE BLDC GLUCOMTR-MCNC: 203 MG/DL — HIGH (ref 70–99)
GLUCOSE BLDC GLUCOMTR-MCNC: 210 MG/DL — HIGH (ref 70–99)
GLUCOSE BLDC GLUCOMTR-MCNC: 212 MG/DL — HIGH (ref 70–99)
GLUCOSE BLDC GLUCOMTR-MCNC: 219 MG/DL — HIGH (ref 70–99)
GLUCOSE SERPL-MCNC: 114 MG/DL — HIGH (ref 70–99)
HCT VFR BLD CALC: 29.5 % — LOW (ref 39–50)
HGB BLD-MCNC: 9.9 G/DL — LOW (ref 13–17)
MAGNESIUM SERPL-MCNC: 2.4 MG/DL — SIGNIFICANT CHANGE UP (ref 1.6–2.6)
MCHC RBC-ENTMCNC: 30.1 PG — SIGNIFICANT CHANGE UP (ref 27–34)
MCHC RBC-ENTMCNC: 33.6 GM/DL — SIGNIFICANT CHANGE UP (ref 32–36)
MCV RBC AUTO: 89.7 FL — SIGNIFICANT CHANGE UP (ref 80–100)
PLATELET # BLD AUTO: 140 K/UL — LOW (ref 150–400)
POTASSIUM SERPL-MCNC: 4.2 MMOL/L — SIGNIFICANT CHANGE UP (ref 3.5–5.3)
POTASSIUM SERPL-SCNC: 4.2 MMOL/L — SIGNIFICANT CHANGE UP (ref 3.5–5.3)
RBC # BLD: 3.29 M/UL — LOW (ref 4.2–5.8)
RBC # FLD: 14 % — SIGNIFICANT CHANGE UP (ref 10.3–14.5)
SODIUM SERPL-SCNC: 140 MMOL/L — SIGNIFICANT CHANGE UP (ref 135–145)
WBC # BLD: 13.14 K/UL — HIGH (ref 3.8–10.5)
WBC # FLD AUTO: 13.14 K/UL — HIGH (ref 3.8–10.5)

## 2023-02-23 PROCEDURE — 93010 ELECTROCARDIOGRAM REPORT: CPT

## 2023-02-23 PROCEDURE — 71045 X-RAY EXAM CHEST 1 VIEW: CPT | Mod: 26

## 2023-02-23 PROCEDURE — 99024 POSTOP FOLLOW-UP VISIT: CPT

## 2023-02-23 PROCEDURE — 99291 CRITICAL CARE FIRST HOUR: CPT

## 2023-02-23 PROCEDURE — 99232 SBSQ HOSP IP/OBS MODERATE 35: CPT

## 2023-02-23 RX ORDER — DEXTROSE 50 % IN WATER 50 %
15 SYRINGE (ML) INTRAVENOUS ONCE
Refills: 0 | Status: DISCONTINUED | OUTPATIENT
Start: 2023-02-23 | End: 2023-02-27

## 2023-02-23 RX ORDER — SODIUM CHLORIDE 9 MG/ML
1000 INJECTION, SOLUTION INTRAVENOUS
Refills: 0 | Status: DISCONTINUED | OUTPATIENT
Start: 2023-02-23 | End: 2023-02-27

## 2023-02-23 RX ORDER — DEXTROSE 50 % IN WATER 50 %
25 SYRINGE (ML) INTRAVENOUS ONCE
Refills: 0 | Status: DISCONTINUED | OUTPATIENT
Start: 2023-02-23 | End: 2023-02-27

## 2023-02-23 RX ORDER — ALBUMIN HUMAN 25 %
250 VIAL (ML) INTRAVENOUS ONCE
Refills: 0 | Status: COMPLETED | OUTPATIENT
Start: 2023-02-23 | End: 2023-02-23

## 2023-02-23 RX ORDER — DEXTROSE 50 % IN WATER 50 %
12.5 SYRINGE (ML) INTRAVENOUS ONCE
Refills: 0 | Status: DISCONTINUED | OUTPATIENT
Start: 2023-02-23 | End: 2023-02-27

## 2023-02-23 RX ORDER — INSULIN GLARGINE 100 [IU]/ML
16 INJECTION, SOLUTION SUBCUTANEOUS AT BEDTIME
Refills: 0 | Status: DISCONTINUED | OUTPATIENT
Start: 2023-02-23 | End: 2023-02-26

## 2023-02-23 RX ORDER — SODIUM CHLORIDE 9 MG/ML
500 INJECTION, SOLUTION INTRAVENOUS ONCE
Refills: 0 | Status: COMPLETED | OUTPATIENT
Start: 2023-02-23 | End: 2023-02-23

## 2023-02-23 RX ORDER — SODIUM CHLORIDE 9 MG/ML
3 INJECTION INTRAMUSCULAR; INTRAVENOUS; SUBCUTANEOUS EVERY 8 HOURS
Refills: 0 | Status: DISCONTINUED | OUTPATIENT
Start: 2023-02-23 | End: 2023-02-27

## 2023-02-23 RX ORDER — INSULIN LISPRO 100/ML
VIAL (ML) SUBCUTANEOUS
Refills: 0 | Status: DISCONTINUED | OUTPATIENT
Start: 2023-02-23 | End: 2023-02-27

## 2023-02-23 RX ORDER — INSULIN GLARGINE 100 [IU]/ML
16 INJECTION, SOLUTION SUBCUTANEOUS ONCE
Refills: 0 | Status: COMPLETED | OUTPATIENT
Start: 2023-02-23 | End: 2023-02-23

## 2023-02-23 RX ORDER — INSULIN LISPRO 100/ML
VIAL (ML) SUBCUTANEOUS
Refills: 0 | Status: DISCONTINUED | OUTPATIENT
Start: 2023-02-23 | End: 2023-02-23

## 2023-02-23 RX ORDER — GLUCAGON INJECTION, SOLUTION 0.5 MG/.1ML
1 INJECTION, SOLUTION SUBCUTANEOUS ONCE
Refills: 0 | Status: DISCONTINUED | OUTPATIENT
Start: 2023-02-23 | End: 2023-02-27

## 2023-02-23 RX ORDER — LACTULOSE 10 G/15ML
20 SOLUTION ORAL EVERY 6 HOURS
Refills: 0 | Status: COMPLETED | OUTPATIENT
Start: 2023-02-23 | End: 2023-02-24

## 2023-02-23 RX ORDER — ATORVASTATIN CALCIUM 80 MG/1
40 TABLET, FILM COATED ORAL AT BEDTIME
Refills: 0 | Status: DISCONTINUED | OUTPATIENT
Start: 2023-02-23 | End: 2023-02-27

## 2023-02-23 RX ADMIN — PANTOPRAZOLE SODIUM 40 MILLIGRAM(S): 20 TABLET, DELAYED RELEASE ORAL at 11:28

## 2023-02-23 RX ADMIN — Medication 2 UNIT(S): at 17:43

## 2023-02-23 RX ADMIN — CHLORHEXIDINE GLUCONATE 1 APPLICATION(S): 213 SOLUTION TOPICAL at 06:56

## 2023-02-23 RX ADMIN — SODIUM CHLORIDE 3 MILLILITER(S): 9 INJECTION INTRAMUSCULAR; INTRAVENOUS; SUBCUTANEOUS at 11:44

## 2023-02-23 RX ADMIN — LIDOCAINE 1 PATCH: 4 CREAM TOPICAL at 22:33

## 2023-02-23 RX ADMIN — LACTULOSE 20 GRAM(S): 10 SOLUTION ORAL at 20:23

## 2023-02-23 RX ADMIN — Medication 4: at 17:44

## 2023-02-23 RX ADMIN — Medication 2 UNIT(S): at 08:16

## 2023-02-23 RX ADMIN — SODIUM CHLORIDE 3 MILLILITER(S): 9 INJECTION INTRAMUSCULAR; INTRAVENOUS; SUBCUTANEOUS at 22:32

## 2023-02-23 RX ADMIN — ENOXAPARIN SODIUM 40 MILLIGRAM(S): 100 INJECTION SUBCUTANEOUS at 11:27

## 2023-02-23 RX ADMIN — Medication 81 MILLIGRAM(S): at 11:27

## 2023-02-23 RX ADMIN — Medication 125 MILLILITER(S): at 03:00

## 2023-02-23 RX ADMIN — SODIUM CHLORIDE 500 MILLILITER(S): 9 INJECTION, SOLUTION INTRAVENOUS at 04:21

## 2023-02-23 RX ADMIN — Medication 4: at 22:40

## 2023-02-23 RX ADMIN — Medication 2 UNIT(S): at 11:28

## 2023-02-23 RX ADMIN — Medication 650 MILLIGRAM(S): at 20:54

## 2023-02-23 RX ADMIN — INSULIN GLARGINE 16 UNIT(S): 100 INJECTION, SOLUTION SUBCUTANEOUS at 22:33

## 2023-02-23 RX ADMIN — POLYETHYLENE GLYCOL 3350 17 GRAM(S): 17 POWDER, FOR SOLUTION ORAL at 11:27

## 2023-02-23 RX ADMIN — Medication 25 MILLIGRAM(S): at 05:52

## 2023-02-23 RX ADMIN — Medication 4: at 11:28

## 2023-02-23 RX ADMIN — Medication 650 MILLIGRAM(S): at 20:24

## 2023-02-23 RX ADMIN — Medication 20 MILLIGRAM(S): at 11:28

## 2023-02-23 RX ADMIN — ATORVASTATIN CALCIUM 40 MILLIGRAM(S): 80 TABLET, FILM COATED ORAL at 22:36

## 2023-02-23 RX ADMIN — Medication 100 MILLIGRAM(S): at 01:43

## 2023-02-23 RX ADMIN — Medication 100 MILLIGRAM(S): at 08:18

## 2023-02-23 RX ADMIN — Medication 25 MILLIGRAM(S): at 17:43

## 2023-02-23 RX ADMIN — CHLORHEXIDINE GLUCONATE 1 APPLICATION(S): 213 SOLUTION TOPICAL at 17:45

## 2023-02-23 RX ADMIN — OXYCODONE HYDROCHLORIDE 10 MILLIGRAM(S): 5 TABLET ORAL at 05:53

## 2023-02-23 RX ADMIN — Medication 166.67 MILLIGRAM(S): at 08:16

## 2023-02-23 RX ADMIN — Medication 975 MILLIGRAM(S): at 06:52

## 2023-02-23 RX ADMIN — LIDOCAINE 1 PATCH: 4 CREAM TOPICAL at 19:00

## 2023-02-23 RX ADMIN — OXYCODONE HYDROCHLORIDE 10 MILLIGRAM(S): 5 TABLET ORAL at 06:52

## 2023-02-23 RX ADMIN — INSULIN GLARGINE 16 UNIT(S): 100 INJECTION, SOLUTION SUBCUTANEOUS at 04:20

## 2023-02-23 RX ADMIN — Medication 975 MILLIGRAM(S): at 05:52

## 2023-02-23 RX ADMIN — DULOXETINE HYDROCHLORIDE 60 MILLIGRAM(S): 30 CAPSULE, DELAYED RELEASE ORAL at 11:27

## 2023-02-23 RX ADMIN — LIDOCAINE 1 PATCH: 4 CREAM TOPICAL at 11:29

## 2023-02-23 RX ADMIN — SENNA PLUS 2 TABLET(S): 8.6 TABLET ORAL at 22:37

## 2023-02-23 NOTE — PROGRESS NOTE ADULT - ASSESSMENT
77y Male with chest pain and left hand/arm weakness.     Left hand weakness.   Distribution suggests radial nerve palsy.   Cannot do MRI secondary to shrapnel fragments.   Continue PT/OT.     Chest pain.   Now status post coronary artery bypass grafting.  Post op care per CICU.    No further specific neurologic recommendations. Will be available as needed.

## 2023-02-23 NOTE — PROGRESS NOTE ADULT - ASSESSMENT
76 y/o M with PMHx of HTN, HLD, DM presented with left arm numbness and abnormal movement, code stroke was called but CT head neg for acute infarct. S/p C with CAD, now s/p CABG.    1. Controlled T2DM, a1c 7.2%  - Transitioned off insulin gtt  - Lantus 16 units qhs  - Admelog 4 units tid with meals  - Sliding scale coverage  - Will observe on these doses    2. CAD  - S/p CABG. Care per CT surgery    3. HLD  - Statin 78 y/o M with PMHx of HTN, HLD, DM presented with left arm numbness and abnormal movement, code stroke was called but CT head neg for acute infarct. S/p C with CAD, now s/p CABG.    1. Controlled T2DM, a1c 7.2%  - Transitioned off insulin gtt  - Lantus 16 units qhs  - Admelog 2 units tid with meals  - Sliding scale coverage  - Will observe on these doses    2. CAD  - S/p CABG. Care per CT surgery    3. HLD  - Statin

## 2023-02-23 NOTE — PROGRESS NOTE ADULT - ASSESSMENT
77y Male PMHx of HTN, HLD, DM presented to ED with c/o L arm numbness, abnormal movement, code stroke called in ED, CT head neg for acute infarct. Labs significant for elevated troponin 1.5. Pt endorses slight constant chest pressure. States over past month have noticed the pressure more when walking dog. EKG concerning for anterior infarct. Pt given plavix 300mg given and asa, pt now s/p LHC and found to have Severe LM 95 %, LAD 90 %, pCir 70 %, pRCA 80 % mid right 98%, distal right 100% RPDA 95 % Ramus 80 %, Right jennifer IABP placed in the cath lab and patient started on Heparin gtt. Pt remains hemodynamically stable in CT ICU. Neurology Clearance obtained for cardiac surgery. Pt now s/p C3 L with Dr. Seo on 2/21

## 2023-02-24 LAB
ANION GAP SERPL CALC-SCNC: 11 MMOL/L — SIGNIFICANT CHANGE UP (ref 5–17)
BUN SERPL-MCNC: 28 MG/DL — HIGH (ref 8–20)
CALCIUM SERPL-MCNC: 8.7 MG/DL — SIGNIFICANT CHANGE UP (ref 8.4–10.5)
CHLORIDE SERPL-SCNC: 103 MMOL/L — SIGNIFICANT CHANGE UP (ref 96–108)
CO2 SERPL-SCNC: 25 MMOL/L — SIGNIFICANT CHANGE UP (ref 22–29)
CREAT SERPL-MCNC: 0.79 MG/DL — SIGNIFICANT CHANGE UP (ref 0.5–1.3)
EGFR: 92 ML/MIN/1.73M2 — SIGNIFICANT CHANGE UP
GLUCOSE BLDC GLUCOMTR-MCNC: 137 MG/DL — HIGH (ref 70–99)
GLUCOSE BLDC GLUCOMTR-MCNC: 143 MG/DL — HIGH (ref 70–99)
GLUCOSE BLDC GLUCOMTR-MCNC: 164 MG/DL — HIGH (ref 70–99)
GLUCOSE BLDC GLUCOMTR-MCNC: 237 MG/DL — HIGH (ref 70–99)
GLUCOSE SERPL-MCNC: 157 MG/DL — HIGH (ref 70–99)
HCT VFR BLD CALC: 32 % — LOW (ref 39–50)
HGB BLD-MCNC: 10.6 G/DL — LOW (ref 13–17)
MAGNESIUM SERPL-MCNC: 2 MG/DL — SIGNIFICANT CHANGE UP (ref 1.6–2.6)
MCHC RBC-ENTMCNC: 29.9 PG — SIGNIFICANT CHANGE UP (ref 27–34)
MCHC RBC-ENTMCNC: 33.1 GM/DL — SIGNIFICANT CHANGE UP (ref 32–36)
MCV RBC AUTO: 90.4 FL — SIGNIFICANT CHANGE UP (ref 80–100)
PLATELET # BLD AUTO: 135 K/UL — LOW (ref 150–400)
POTASSIUM SERPL-MCNC: 3.4 MMOL/L — LOW (ref 3.5–5.3)
POTASSIUM SERPL-SCNC: 3.4 MMOL/L — LOW (ref 3.5–5.3)
RBC # BLD: 3.54 M/UL — LOW (ref 4.2–5.8)
RBC # FLD: 13.5 % — SIGNIFICANT CHANGE UP (ref 10.3–14.5)
SODIUM SERPL-SCNC: 139 MMOL/L — SIGNIFICANT CHANGE UP (ref 135–145)
WBC # BLD: 12.05 K/UL — HIGH (ref 3.8–10.5)
WBC # FLD AUTO: 12.05 K/UL — HIGH (ref 3.8–10.5)

## 2023-02-24 PROCEDURE — 71045 X-RAY EXAM CHEST 1 VIEW: CPT | Mod: 26,76

## 2023-02-24 PROCEDURE — 99024 POSTOP FOLLOW-UP VISIT: CPT

## 2023-02-24 PROCEDURE — 99232 SBSQ HOSP IP/OBS MODERATE 35: CPT

## 2023-02-24 PROCEDURE — 93010 ELECTROCARDIOGRAM REPORT: CPT

## 2023-02-24 RX ORDER — COLCHICINE 0.6 MG
0.6 TABLET ORAL
Refills: 0 | Status: DISCONTINUED | OUTPATIENT
Start: 2023-02-25 | End: 2023-02-26

## 2023-02-24 RX ORDER — COLCHICINE 0.6 MG
1.2 TABLET ORAL ONCE
Refills: 0 | Status: COMPLETED | OUTPATIENT
Start: 2023-02-24 | End: 2023-02-24

## 2023-02-24 RX ORDER — MAGNESIUM SULFATE 500 MG/ML
2 VIAL (ML) INJECTION ONCE
Refills: 0 | Status: COMPLETED | OUTPATIENT
Start: 2023-02-24 | End: 2023-02-24

## 2023-02-24 RX ORDER — POTASSIUM CHLORIDE 20 MEQ
40 PACKET (EA) ORAL EVERY 4 HOURS
Refills: 0 | Status: COMPLETED | OUTPATIENT
Start: 2023-02-24 | End: 2023-02-24

## 2023-02-24 RX ADMIN — INSULIN GLARGINE 16 UNIT(S): 100 INJECTION, SOLUTION SUBCUTANEOUS at 21:30

## 2023-02-24 RX ADMIN — ENOXAPARIN SODIUM 40 MILLIGRAM(S): 100 INJECTION SUBCUTANEOUS at 21:30

## 2023-02-24 RX ADMIN — Medication 2 UNIT(S): at 17:16

## 2023-02-24 RX ADMIN — Medication 25 MILLIGRAM(S): at 05:48

## 2023-02-24 RX ADMIN — Medication 25 MILLIGRAM(S): at 17:16

## 2023-02-24 RX ADMIN — Medication 40 MILLIEQUIVALENT(S): at 13:15

## 2023-02-24 RX ADMIN — Medication 2 UNIT(S): at 12:18

## 2023-02-24 RX ADMIN — POLYETHYLENE GLYCOL 3350 17 GRAM(S): 17 POWDER, FOR SOLUTION ORAL at 13:16

## 2023-02-24 RX ADMIN — ATORVASTATIN CALCIUM 40 MILLIGRAM(S): 80 TABLET, FILM COATED ORAL at 21:30

## 2023-02-24 RX ADMIN — Medication 4: at 12:19

## 2023-02-24 RX ADMIN — DULOXETINE HYDROCHLORIDE 60 MILLIGRAM(S): 30 CAPSULE, DELAYED RELEASE ORAL at 13:15

## 2023-02-24 RX ADMIN — Medication 25 GRAM(S): at 08:56

## 2023-02-24 RX ADMIN — SODIUM CHLORIDE 3 MILLILITER(S): 9 INJECTION INTRAMUSCULAR; INTRAVENOUS; SUBCUTANEOUS at 13:13

## 2023-02-24 RX ADMIN — Medication 40 MILLIEQUIVALENT(S): at 08:56

## 2023-02-24 RX ADMIN — SODIUM CHLORIDE 3 MILLILITER(S): 9 INJECTION INTRAMUSCULAR; INTRAVENOUS; SUBCUTANEOUS at 21:17

## 2023-02-24 RX ADMIN — Medication 20 MILLIGRAM(S): at 05:47

## 2023-02-24 RX ADMIN — PANTOPRAZOLE SODIUM 40 MILLIGRAM(S): 20 TABLET, DELAYED RELEASE ORAL at 13:16

## 2023-02-24 RX ADMIN — Medication 81 MILLIGRAM(S): at 13:16

## 2023-02-24 RX ADMIN — SODIUM CHLORIDE 3 MILLILITER(S): 9 INJECTION INTRAMUSCULAR; INTRAVENOUS; SUBCUTANEOUS at 06:28

## 2023-02-24 RX ADMIN — Medication 2: at 08:56

## 2023-02-24 RX ADMIN — Medication 1.2 MILLIGRAM(S): at 09:56

## 2023-02-24 RX ADMIN — LACTULOSE 20 GRAM(S): 10 SOLUTION ORAL at 00:34

## 2023-02-24 RX ADMIN — Medication 2 UNIT(S): at 08:55

## 2023-02-24 NOTE — PROGRESS NOTE ADULT - NS ATTEND AMEND GEN_ALL_CORE FT
will try to convert to his outpatient regimen of metformin and a dpp-4, and at least stop prandial insulin.
will hopefully be able to reduce or eliminate insulin later in course
NSTEMI:  s/p LHC- MVD IABP. now POD #1 CABG LIMA to LAD, SVG to OM, SVG to PL. Extubated this am   tele - SR, SB. TVP in place rate set at 50  Ischemic cardiomyopathy. s/p CABG.  EF 25-30%, sludge in LV apex, no thrombus, +RWMA  on milrinone 0.2.  Further GDMT as pt condition improves post op.

## 2023-02-24 NOTE — PROGRESS NOTE ADULT - ASSESSMENT
76 y/o M with PMHx of HTN, HLD, DM presented with left arm numbness and abnormal movement, code stroke was called but CT head neg for acute infarct. S/p C with CAD, now s/p CABG.    1. Controlled T2DM, a1c 7.2%  - Continue lantus 16 units qhs and admelog 2 units tid with meals  - Was previously on metformin extended release and alogliptin  - Can restart metformin 1000mg bid ER and januvia 100mg daily and discontinue standing admelog  - D/w CT surgery PA    2. CAD  - S/p CABG. Care per CT surgery    3. HLD  - Statin

## 2023-02-25 LAB
ANION GAP SERPL CALC-SCNC: 11 MMOL/L — SIGNIFICANT CHANGE UP (ref 5–17)
BUN SERPL-MCNC: 25 MG/DL — HIGH (ref 8–20)
CALCIUM SERPL-MCNC: 9.2 MG/DL — SIGNIFICANT CHANGE UP (ref 8.4–10.5)
CHLORIDE SERPL-SCNC: 101 MMOL/L — SIGNIFICANT CHANGE UP (ref 96–108)
CO2 SERPL-SCNC: 28 MMOL/L — SIGNIFICANT CHANGE UP (ref 22–29)
CREAT SERPL-MCNC: 0.79 MG/DL — SIGNIFICANT CHANGE UP (ref 0.5–1.3)
EGFR: 92 ML/MIN/1.73M2 — SIGNIFICANT CHANGE UP
GLUCOSE BLDC GLUCOMTR-MCNC: 133 MG/DL — HIGH (ref 70–99)
GLUCOSE BLDC GLUCOMTR-MCNC: 144 MG/DL — HIGH (ref 70–99)
GLUCOSE BLDC GLUCOMTR-MCNC: 156 MG/DL — HIGH (ref 70–99)
GLUCOSE BLDC GLUCOMTR-MCNC: 159 MG/DL — HIGH (ref 70–99)
GLUCOSE BLDC GLUCOMTR-MCNC: 198 MG/DL — HIGH (ref 70–99)
GLUCOSE SERPL-MCNC: 120 MG/DL — HIGH (ref 70–99)
HCT VFR BLD CALC: 34.5 % — LOW (ref 39–50)
HGB BLD-MCNC: 11.4 G/DL — LOW (ref 13–17)
MAGNESIUM SERPL-MCNC: 2.2 MG/DL — SIGNIFICANT CHANGE UP (ref 1.6–2.6)
MCHC RBC-ENTMCNC: 30.2 PG — SIGNIFICANT CHANGE UP (ref 27–34)
MCHC RBC-ENTMCNC: 33 GM/DL — SIGNIFICANT CHANGE UP (ref 32–36)
MCV RBC AUTO: 91.3 FL — SIGNIFICANT CHANGE UP (ref 80–100)
PLATELET # BLD AUTO: 160 K/UL — SIGNIFICANT CHANGE UP (ref 150–400)
POTASSIUM SERPL-MCNC: 4.3 MMOL/L — SIGNIFICANT CHANGE UP (ref 3.5–5.3)
POTASSIUM SERPL-SCNC: 4.3 MMOL/L — SIGNIFICANT CHANGE UP (ref 3.5–5.3)
RBC # BLD: 3.78 M/UL — LOW (ref 4.2–5.8)
RBC # FLD: 13.2 % — SIGNIFICANT CHANGE UP (ref 10.3–14.5)
SODIUM SERPL-SCNC: 140 MMOL/L — SIGNIFICANT CHANGE UP (ref 135–145)
WBC # BLD: 9.77 K/UL — SIGNIFICANT CHANGE UP (ref 3.8–10.5)
WBC # FLD AUTO: 9.77 K/UL — SIGNIFICANT CHANGE UP (ref 3.8–10.5)

## 2023-02-25 PROCEDURE — 71045 X-RAY EXAM CHEST 1 VIEW: CPT | Mod: 26

## 2023-02-25 PROCEDURE — 71045 X-RAY EXAM CHEST 1 VIEW: CPT | Mod: 26,77

## 2023-02-25 PROCEDURE — 93010 ELECTROCARDIOGRAM REPORT: CPT

## 2023-02-25 PROCEDURE — 99024 POSTOP FOLLOW-UP VISIT: CPT

## 2023-02-25 RX ORDER — METFORMIN HYDROCHLORIDE 850 MG/1
1000 TABLET ORAL
Refills: 0 | Status: DISCONTINUED | OUTPATIENT
Start: 2023-02-25 | End: 2023-02-27

## 2023-02-25 RX ORDER — LINAGLIPTIN 5 MG/1
5 TABLET, FILM COATED ORAL DAILY
Refills: 0 | Status: DISCONTINUED | OUTPATIENT
Start: 2023-02-25 | End: 2023-02-27

## 2023-02-25 RX ADMIN — LINAGLIPTIN 5 MILLIGRAM(S): 5 TABLET, FILM COATED ORAL at 13:08

## 2023-02-25 RX ADMIN — Medication 2: at 13:07

## 2023-02-25 RX ADMIN — SODIUM CHLORIDE 3 MILLILITER(S): 9 INJECTION INTRAMUSCULAR; INTRAVENOUS; SUBCUTANEOUS at 12:15

## 2023-02-25 RX ADMIN — Medication 25 MILLIGRAM(S): at 17:22

## 2023-02-25 RX ADMIN — Medication 0.6 MILLIGRAM(S): at 05:03

## 2023-02-25 RX ADMIN — INSULIN GLARGINE 16 UNIT(S): 100 INJECTION, SOLUTION SUBCUTANEOUS at 21:17

## 2023-02-25 RX ADMIN — SODIUM CHLORIDE 3 MILLILITER(S): 9 INJECTION INTRAMUSCULAR; INTRAVENOUS; SUBCUTANEOUS at 21:14

## 2023-02-25 RX ADMIN — ENOXAPARIN SODIUM 40 MILLIGRAM(S): 100 INJECTION SUBCUTANEOUS at 21:17

## 2023-02-25 RX ADMIN — Medication 25 MILLIGRAM(S): at 05:03

## 2023-02-25 RX ADMIN — Medication 20 MILLIGRAM(S): at 05:03

## 2023-02-25 RX ADMIN — ATORVASTATIN CALCIUM 40 MILLIGRAM(S): 80 TABLET, FILM COATED ORAL at 21:17

## 2023-02-25 RX ADMIN — SODIUM CHLORIDE 3 MILLILITER(S): 9 INJECTION INTRAMUSCULAR; INTRAVENOUS; SUBCUTANEOUS at 05:02

## 2023-02-25 RX ADMIN — Medication 2: at 21:18

## 2023-02-25 RX ADMIN — DULOXETINE HYDROCHLORIDE 60 MILLIGRAM(S): 30 CAPSULE, DELAYED RELEASE ORAL at 13:08

## 2023-02-25 RX ADMIN — METFORMIN HYDROCHLORIDE 1000 MILLIGRAM(S): 850 TABLET ORAL at 06:27

## 2023-02-25 RX ADMIN — Medication 81 MILLIGRAM(S): at 12:07

## 2023-02-25 RX ADMIN — PANTOPRAZOLE SODIUM 40 MILLIGRAM(S): 20 TABLET, DELAYED RELEASE ORAL at 12:10

## 2023-02-25 RX ADMIN — METFORMIN HYDROCHLORIDE 1000 MILLIGRAM(S): 850 TABLET ORAL at 17:22

## 2023-02-25 RX ADMIN — Medication 0.6 MILLIGRAM(S): at 17:22

## 2023-02-25 NOTE — PROGRESS NOTE ADULT - ASSESSMENT
77y Male PMHx of HTN, HLD, DM presented to ED with c/o L arm numbness, abnormal movement, code stroke called in ED, CT head neg for acute infarct. Labs significant for elevated troponin 1.5. Pt endorses slight constant chest pressure. States over past month have noticed the pressure more when walking dog. EKG concerning for anterior infarct. Pt given plavix 300mg given and asa, pt now s/p LHC and found to have Severe LM 95 %, LAD 90 %, pCir 70 %, pRCA 80 % mid right 98%, distal right 100% RPDA 95 % Ramus 80 %, Right jennifer IABP placed in the cath lab and patient started on Heparin gtt. Pt remains hemodynamically stable in CT ICU. Neurology Clearance obtained for cardiac surgery. Pt now s/p C3 L with Dr. Seo on 2/21.  2/24 right CT dc'd.

## 2023-02-26 ENCOUNTER — TRANSCRIPTION ENCOUNTER (OUTPATIENT)
Age: 78
End: 2023-02-26

## 2023-02-26 LAB
ANION GAP SERPL CALC-SCNC: 12 MMOL/L — SIGNIFICANT CHANGE UP (ref 5–17)
BUN SERPL-MCNC: 27.1 MG/DL — HIGH (ref 8–20)
CALCIUM SERPL-MCNC: 8.8 MG/DL — SIGNIFICANT CHANGE UP (ref 8.4–10.5)
CHLORIDE SERPL-SCNC: 101 MMOL/L — SIGNIFICANT CHANGE UP (ref 96–108)
CO2 SERPL-SCNC: 26 MMOL/L — SIGNIFICANT CHANGE UP (ref 22–29)
CREAT SERPL-MCNC: 0.73 MG/DL — SIGNIFICANT CHANGE UP (ref 0.5–1.3)
EGFR: 94 ML/MIN/1.73M2 — SIGNIFICANT CHANGE UP
GLUCOSE BLDC GLUCOMTR-MCNC: 121 MG/DL — HIGH (ref 70–99)
GLUCOSE BLDC GLUCOMTR-MCNC: 152 MG/DL — HIGH (ref 70–99)
GLUCOSE BLDC GLUCOMTR-MCNC: 153 MG/DL — HIGH (ref 70–99)
GLUCOSE BLDC GLUCOMTR-MCNC: 168 MG/DL — HIGH (ref 70–99)
GLUCOSE SERPL-MCNC: 116 MG/DL — HIGH (ref 70–99)
HCT VFR BLD CALC: 38.3 % — LOW (ref 39–50)
HGB BLD-MCNC: 12.5 G/DL — LOW (ref 13–17)
MAGNESIUM SERPL-MCNC: 2 MG/DL — SIGNIFICANT CHANGE UP (ref 1.6–2.6)
MCHC RBC-ENTMCNC: 29.8 PG — SIGNIFICANT CHANGE UP (ref 27–34)
MCHC RBC-ENTMCNC: 32.6 GM/DL — SIGNIFICANT CHANGE UP (ref 32–36)
MCV RBC AUTO: 91.2 FL — SIGNIFICANT CHANGE UP (ref 80–100)
PLATELET # BLD AUTO: 185 K/UL — SIGNIFICANT CHANGE UP (ref 150–400)
POTASSIUM SERPL-MCNC: 3.6 MMOL/L — SIGNIFICANT CHANGE UP (ref 3.5–5.3)
POTASSIUM SERPL-SCNC: 3.6 MMOL/L — SIGNIFICANT CHANGE UP (ref 3.5–5.3)
RBC # BLD: 4.2 M/UL — SIGNIFICANT CHANGE UP (ref 4.2–5.8)
RBC # FLD: 13 % — SIGNIFICANT CHANGE UP (ref 10.3–14.5)
SODIUM SERPL-SCNC: 138 MMOL/L — SIGNIFICANT CHANGE UP (ref 135–145)
WBC # BLD: 10.89 K/UL — HIGH (ref 3.8–10.5)
WBC # FLD AUTO: 10.89 K/UL — HIGH (ref 3.8–10.5)

## 2023-02-26 PROCEDURE — 99024 POSTOP FOLLOW-UP VISIT: CPT

## 2023-02-26 PROCEDURE — 71045 X-RAY EXAM CHEST 1 VIEW: CPT | Mod: 26,77

## 2023-02-26 PROCEDURE — 99232 SBSQ HOSP IP/OBS MODERATE 35: CPT

## 2023-02-26 PROCEDURE — 71045 X-RAY EXAM CHEST 1 VIEW: CPT | Mod: 26

## 2023-02-26 RX ORDER — POTASSIUM CHLORIDE 20 MEQ
40 PACKET (EA) ORAL ONCE
Refills: 0 | Status: COMPLETED | OUTPATIENT
Start: 2023-02-26 | End: 2023-02-26

## 2023-02-26 RX ADMIN — METFORMIN HYDROCHLORIDE 1000 MILLIGRAM(S): 850 TABLET ORAL at 17:15

## 2023-02-26 RX ADMIN — LINAGLIPTIN 5 MILLIGRAM(S): 5 TABLET, FILM COATED ORAL at 10:50

## 2023-02-26 RX ADMIN — Medication 20 MILLIGRAM(S): at 05:22

## 2023-02-26 RX ADMIN — Medication 40 MILLIEQUIVALENT(S): at 10:49

## 2023-02-26 RX ADMIN — SODIUM CHLORIDE 3 MILLILITER(S): 9 INJECTION INTRAMUSCULAR; INTRAVENOUS; SUBCUTANEOUS at 13:46

## 2023-02-26 RX ADMIN — ENOXAPARIN SODIUM 40 MILLIGRAM(S): 100 INJECTION SUBCUTANEOUS at 21:04

## 2023-02-26 RX ADMIN — Medication 2: at 17:16

## 2023-02-26 RX ADMIN — METFORMIN HYDROCHLORIDE 1000 MILLIGRAM(S): 850 TABLET ORAL at 05:22

## 2023-02-26 RX ADMIN — SODIUM CHLORIDE 3 MILLILITER(S): 9 INJECTION INTRAMUSCULAR; INTRAVENOUS; SUBCUTANEOUS at 05:21

## 2023-02-26 RX ADMIN — Medication 0.6 MILLIGRAM(S): at 05:22

## 2023-02-26 RX ADMIN — Medication 2: at 12:25

## 2023-02-26 RX ADMIN — Medication 2: at 21:38

## 2023-02-26 RX ADMIN — Medication 25 MILLIGRAM(S): at 05:23

## 2023-02-26 RX ADMIN — PANTOPRAZOLE SODIUM 40 MILLIGRAM(S): 20 TABLET, DELAYED RELEASE ORAL at 10:49

## 2023-02-26 RX ADMIN — ATORVASTATIN CALCIUM 40 MILLIGRAM(S): 80 TABLET, FILM COATED ORAL at 21:04

## 2023-02-26 RX ADMIN — SODIUM CHLORIDE 3 MILLILITER(S): 9 INJECTION INTRAMUSCULAR; INTRAVENOUS; SUBCUTANEOUS at 21:00

## 2023-02-26 RX ADMIN — Medication 81 MILLIGRAM(S): at 10:49

## 2023-02-26 RX ADMIN — DULOXETINE HYDROCHLORIDE 60 MILLIGRAM(S): 30 CAPSULE, DELAYED RELEASE ORAL at 10:49

## 2023-02-26 NOTE — PROGRESS NOTE ADULT - PROBLEM SELECTOR PROBLEM 3
HTN (hypertension)
Ischemic cardiomyopathy
HTN (hypertension)

## 2023-02-26 NOTE — DISCHARGE NOTE PROVIDER - NSDCMRMEDTOKEN_GEN_ALL_CORE_FT
Alogliptin 12.5 mg oral tablet: 1 tab(s) orally once a day  DULoxetine 60 mg oral delayed release capsule: 1 cap(s) orally once a day  lisinopril 40 mg oral tablet: 1 tab(s) orally once a day  metFORMIN 1000 mg oral tablet, extended release: 2 tab(s) orally once a day   acetaminophen 325 mg oral tablet: 2 tab(s) orally every 6 hours, As needed, Mild Pain (1 - 3)  Alogliptin 12.5 mg oral tablet: 1 tab(s) orally once a day  aspirin 81 mg oral delayed release tablet: 1 tab(s) orally once a day  atorvastatin 40 mg oral tablet: 1 tab(s) orally once a day (at bedtime)  DULoxetine 60 mg oral delayed release capsule: 1 cap(s) orally once a day  K-Tab 10 mEq oral tablet, extended release: 1 tab(s) orally once a day   While taking Torsemide   metFORMIN 1000 mg oral tablet, extended release: 2 tab(s) orally once a day  metoprolol tartrate 25 mg oral tablet: 1 tab(s) orally 2 times a day  oxyCODONE 5 mg oral tablet: 1 tab(s) orally every 6 hours, As Needed -severe pain MDD:4 tabs  torsemide 10 mg oral tablet: 1 tab(s) orally once a day   acetaminophen 325 mg oral tablet: 2 tab(s) orally every 6 hours, As needed, Mild Pain (1 - 3)  Alogliptin 12.5 mg oral tablet: 1 tab(s) orally once a day  aspirin 81 mg oral delayed release tablet: 1 tab(s) orally once a day  atorvastatin 40 mg oral tablet: 1 tab(s) orally once a day (at bedtime)  DULoxetine 60 mg oral delayed release capsule: 1 cap(s) orally once a day  metFORMIN 1000 mg oral tablet, extended release: 2 tab(s) orally once a day  metoprolol tartrate 25 mg oral tablet: 1 tab(s) orally 2 times a day  oxyCODONE 5 mg oral tablet: 1 tab(s) orally every 6 hours, As Needed -severe pain MDD:4 tabs

## 2023-02-26 NOTE — PROGRESS NOTE ADULT - PROBLEM SELECTOR PROBLEM 1
ACS (acute coronary syndrome)

## 2023-02-26 NOTE — PROGRESS NOTE ADULT - ASSESSMENT
glycemic control is stable. Pt. can resume his normal outpatient meds, and should not require insulin

## 2023-02-26 NOTE — DISCHARGE NOTE PROVIDER - CARE PROVIDER_API CALL
Adrián Seo; BORIS)  Surgery; Thoracic and Cardiac Surgery  [address] [place] [address]  Phone: [phone]  Fax: [phone]  Follow Up Time:     Umberto Hale)  EndocrinologyMetabDiabetes; Internal Medicine  [address] [place] [address]  Phone: [phone]  Fax: [phone]  Follow Up Time:     Zach Fair)  Neurology  [address] [facility], [address]  [place] [address]  Phone: [phone]  Fax: [phone]  Follow Up Time:    Adrián eSo; BORIS)  Surgery; Thoracic and Cardiac Surgery  [address] [place] [address]  Phone: [phone]  Fax: [phone]  Follow Up Time:     Umberto Hale)  EndocrinologyMetabDiabetes; Internal Medicine  [address] [place] [address]  Phone: [phone]  Fax: [phone]  Follow Up Time:     Zach Fair)  Neurology  [address] [facility], [address]  [place] [address]  Phone: [phone]  Fax: [phone]  Follow Up Time:     Alfonso Schmitz)  Cardiovascular Disease; Internal Medicine; Interventional Cardiology  [address] [facility], [address]  [place] [address]  Phone: [phone]  Fax: [phone]  Follow Up Time:

## 2023-02-26 NOTE — DISCHARGE NOTE PROVIDER - NSDCFUADDINST_GEN_ALL_CORE_FT
Please call the Cardiothoracic Surgery office at [phone] if you are experiencing any shortness of breath, chest pain, fevers or chills, drainage from the incisions, persistent nausea, vomiting or if you have any questions about your medications. If the symptoms are severe, call 911 and go to the nearest hospital.

## 2023-02-26 NOTE — PROGRESS NOTE ADULT - PROVIDER SPECIALTY LIST ADULT
Critical Care
Endocrinology
Critical Care
Endocrinology
Critical Care
Critical Care
Neurology
Neurology
CT Surgery
Endocrinology
Cardiology
CT Surgery

## 2023-02-26 NOTE — DISCHARGE NOTE NURSING/CASE MANAGEMENT/SOCIAL WORK - NSDCPEFALRISK_GEN_ALL_CORE
For information on Fall & Injury Prevention, visit: [email or web address] OR  [email or web address] OR  https://www.cdc.gov/steadi/patient.html

## 2023-02-26 NOTE — DISCHARGE NOTE PROVIDER - NSDCCPTREATMENT_GEN_ALL_CORE_FT
PRINCIPAL PROCEDURE  Procedure: CABG, with saphenous vein graft  Findings and Treatment: C3L (lima to lad svg to om2 svg to pl with endoscopic right greater saphenous vein harvesting)

## 2023-02-26 NOTE — DISCHARGE NOTE NURSING/CASE MANAGEMENT/SOCIAL WORK - PATIENT PORTAL LINK FT
You can access the FollowMyHealth Patient Portal offered by [facility] by registering at the following website: [email or web address]. By joining [employer]’s FollowMyHealth portal, you will also be able to view your health information using other applications (apps) compatible with our system.

## 2023-02-26 NOTE — DISCHARGE NOTE PROVIDER - NSDCCPCAREPLAN_GEN_ALL_CORE_FT
PRINCIPAL DISCHARGE DIAGNOSIS  Diagnosis: CAD, multiple vessel  Assessment and Plan of Treatment: 1. Take ALL of your medications as ordered. Fill your prescriptions the day you are discharged and take according to the schedule you were given. Continue to take a stool softener if you are taking narcotic pain medications. AVOID medications such as ibuprofen or naproxen if you have had bypass surgery. If you have any questions or are unable to fill the prescriptions, please call the office right away at [phone].  2. Shower daily. Clean all incisions daily while showering with warm water and mild soap, pat dry with a clean towel and do not cover with any dressings unless instructed to. No bathing, swimming in a pool or the ocean until instructed by MD.  DO NOT use creams or lotions on the wound.  3. We advise that you do not drive until instructed by MD.   4. You may not return to work until instructed by MD.   5. Please eat a low fat, low cholesterol, low salt diet. (No added/extra salt)  6. Weigh yourself every day in the morning and record it in the weight log in your red folder. Notify the office of any weight gain more than 2-3 pounds in 24 hours.  7. Continue breathing exercises several times a day. Continue to use your heart pillow.  8. No heavy lifting nothing greater than 5 pounds until cleared by MD.   9. Call / Notify MD any fever greater than 101.0, any drainage from incisions or if they become red, hot or very tender to the touch.  10. Increase activity as tolerated. Walk indoors and/or outdoors at least 3 times a day.      SECONDARY DISCHARGE DIAGNOSES  Diagnosis: Type 2 diabetes mellitus  Assessment and Plan of Treatment: Make your calories count by choosin. Healthy carbohydrates such as fruits, vegetables, whole grains, legumes (beans, peas and lentils) and low-fat dairy products.  2. Fiber-rich foods such as vegetables, fruits, nuts, legumes, whole-wheat flour and wheat bran.  3. Heart-healthy fish twice a week such as cod, tuna and halibut (low-fat options) as well as salmon, mackerel, tuna and bluefish, which are rich in omega-3 fatty acids. Avoid fish with high levels of mercury.  4. "Good" fats such as avocados, almonds, pecans, walnuts, olives and canola, olive and peanut oils.   5. Minimize foods with high saturated fats (beef, hot dogs, sausage and knowles), trans fats (processed snacks, baked goods, shortening and stick margarines), high cholesterol foods (high fat dairy products and animal proteins, fried food) and high sodium foods (fast food, many frozen foods, processed food).  Take all medications as prescribed. Follow up with your primary care doctor within two weeks.    Diagnosis: HLD (hyperlipidemia)  Assessment and Plan of Treatment: Take all medications as prescribed. Follow up with your primary care doctor within two weeks.    Diagnosis: HTN (hypertension)  Assessment and Plan of Treatment: You have hypertension, or high blood pressure. It is very important to continue your medication as ordered. High blood pressure increases your risk for heart attack, stroke and kidney disease. It does not usually cause symptoms but it can be serious.   1. Be sure to take all of your medication as prescribed.  2. You may find it helpful to get a home blood pressure meter and check your blood pressure periodically.  3. Lifestyle changes can improve your blood pressure and lessen the amount of medication you need, such as: losing weight, choosing a diet low in fat and rich in fruits, vegetables and low-fat dairy products, reducing the amount of salt you eat, cut down on alcohol (to less than two drinks per day) and do something active most days for 30 minutes or more.  Take all medications as prescribed. Follow up with your primary care doctor within two weeks.    Diagnosis: Neuropathy of left radial nerve  Assessment and Plan of Treatment: Take all medications as prescribed. Follow up with neurology as outpatient as needed.     PRINCIPAL DISCHARGE DIAGNOSIS  Diagnosis: CAD, multiple vessel  Assessment and Plan of Treatment: 1. Take ALL of your medications as ordered. Fill your prescriptions the day you are discharged and take according to the schedule you were given. Continue to take a stool softener if you are taking narcotic pain medications. AVOID medications such as ibuprofen or naproxen if you have had bypass surgery. If you have any questions or are unable to fill the prescriptions, please call the office right away at [phone].  2. Shower daily. Clean all incisions daily while showering with warm water and mild soap, pat dry with a clean towel and do not cover with any dressings unless instructed to. No bathing, swimming in a pool or the ocean until instructed by MD.  DO NOT use creams or lotions on the wound.  3. We advise that you do not drive until instructed by MD.   4. You may not return to work until instructed by MD.   5. Please eat a low fat, low cholesterol, low salt diet. (No added/extra salt)  6. Weigh yourself every day in the morning and record it in the weight log in your red folder. Notify the office of any weight gain more than 2-3 pounds in 24 hours.  7. Continue breathing exercises several times a day. Continue to use your heart pillow.  8. No heavy lifting nothing greater than 5 pounds until cleared by MD.   9. Call / Notify MD any fever greater than 101.0, any drainage from incisions or if they become red, hot or very tender to the touch.  10. Increase activity as tolerated. Walk indoors and/or outdoors at least 3 times a day.      SECONDARY DISCHARGE DIAGNOSES  Diagnosis: Neuropathy of left radial nerve  Assessment and Plan of Treatment: Take all medications as prescribed. Follow up with neurology as outpatient as needed.    Diagnosis: HTN (hypertension)  Assessment and Plan of Treatment: You have hypertension, or high blood pressure. It is very important to continue your medication as ordered. High blood pressure increases your risk for heart attack, stroke and kidney disease. It does not usually cause symptoms but it can be serious.   1. Be sure to take all of your medication as prescribed.  2. You may find it helpful to get a home blood pressure meter and check your blood pressure periodically.  3. Lifestyle changes can improve your blood pressure and lessen the amount of medication you need, such as: losing weight, choosing a diet low in fat and rich in fruits, vegetables and low-fat dairy products, reducing the amount of salt you eat, cut down on alcohol (to less than two drinks per day) and do something active most days for 30 minutes or more.  Take all medications as prescribed. Follow up with your primary care doctor within two weeks.    Diagnosis: HLD (hyperlipidemia)  Assessment and Plan of Treatment: Take all medications as prescribed. Follow up with your primary care doctor within two weeks.    Diagnosis: Type 2 diabetes mellitus  Assessment and Plan of Treatment: Make your calories count by choosin. Healthy carbohydrates such as fruits, vegetables, whole grains, legumes (beans, peas and lentils) and low-fat dairy products.  2. Fiber-rich foods such as vegetables, fruits, nuts, legumes, whole-wheat flour and wheat bran.  3. Heart-healthy fish twice a week such as cod, tuna and halibut (low-fat options) as well as salmon, mackerel, tuna and bluefish, which are rich in omega-3 fatty acids. Avoid fish with high levels of mercury.  4. "Good" fats such as avocados, almonds, pecans, walnuts, olives and canola, olive and peanut oils.   5. Minimize foods with high saturated fats (beef, hot dogs, sausage and knowles), trans fats (processed snacks, baked goods, shortening and stick margarines), high cholesterol foods (high fat dairy products and animal proteins, fried food) and high sodium foods (fast food, many frozen foods, processed food).  Take all medications as prescribed. Follow up with your primary care doctor within two weeks.

## 2023-02-26 NOTE — DISCHARGE NOTE PROVIDER - NSDCFUADDAPPT_GEN_ALL_CORE_FT
The cardiac surgery office is located at [facility], first floor. Take a left at the end of the lobby until the end of that leal (past the elevator bank). Make a left and the office is on your right across from the elevators.    Your Care Navigator Nurse Practitioner will be in touch to see you in your home within a few days from discharge. The Follow Your Heart program can help ensure you understand your medications, discharge instructions and answer any questions you may have at that time. They are also a great source to address concerns during the day and may be reached at [phone].    Please follow up with your Cardiologist and Primary Care Physician 1-2 weeks from discharge.    Please follow instructions outlined in discharge booklet. Continue all medications as prescribed.   The cardiac surgery office is located at [facility], first floor. Take a left at the end of the lobby until the end of that leal (past the elevator bank). Make a left and the office is on your right across from the elevators.    Your Care Navigator Nurse Practitioner will be in touch to see you in your home within a few days from discharge. The Follow Your Heart program can help ensure you understand your medications, discharge instructions and answer any questions you may have at that time. They are also a great source to address concerns during the day and may be reached at [phone].    Please follow up with your Cardiologist and Primary Care Physician 1-2 weeks from discharge.  Please call to make appointments.     Please follow instructions outlined in discharge booklet. Continue all medications as prescribed.   The cardiac surgery office is located at [facility], first floor. Take a left at the end of the lobby until the end of that leal (past the elevator bank). Make a left and the office is on your right across from the elevators.    There is a prescription for a chest xray in your discharge folder.  Please come to the hospital approximately 1 hour prior to your appointment to get a chest xray.  Bring the prescription with you.     Your Care Navigator Nurse Practitioner will be in touch to see you in your home within a few days from discharge. The Follow Your Heart program can help ensure you understand your medications, discharge instructions and answer any questions you may have at that time. They are also a great source to address concerns during the day and may be reached at [phone].    Please follow up with your Cardiologist and Primary Care Physician 1-2 weeks from discharge.  Please call to make appointments.     Please follow instructions outlined in discharge booklet. Continue all medications as prescribed.   Please follow up with Dr. Seo on 3/10/23 at 11:30AM  at [facility].   The cardiac surgery office is located at [facility], first floor. Take a left at the end of the lobby until the end of that leal (past the elevator bank). Make a left and the office is on your right across from the elevators.    ***There is a prescription for a chest xray in your discharge folder.  Please come to the hospital approximately 1 hour prior to your appointment to get a chest xray.  Bring the prescription with you.     Your Care Navigator Nurse Practitioner will be in touch to see you in your home within a few days from discharge. The Follow Your Heart program can help ensure you understand your medications, discharge instructions and answer any questions you may have at that time. They are also a great source to address concerns during the day and may be reached at [phone].    Please follow up with your Cardiologist and Primary Care Physician 1-2 weeks from discharge.  Please call to make appointments.     Please follow instructions outlined in discharge booklet. Continue all medications as prescribed.

## 2023-02-26 NOTE — DISCHARGE NOTE PROVIDER - CARE PROVIDERS DIRECT ADDRESSES
,DirectAddress_Unknown,[email or web address] ,DirectAddress_Unknown,[email or web address],DirectAddress_Unknown

## 2023-02-26 NOTE — PROGRESS NOTE ADULT - PROBLEM SELECTOR PLAN 3
C/w BB with holding parameters
Cont BB therapy
cont BB once off inotrope
Cont BB therapy
.  - s/p CABG  - EF 25-30%, sludge in LV apex, no thrombus, +RWMA  - on milrinone 0.2  - Further GDMT as pt condition improves post op.   -  Euvolemic today and on initial exam
Cont BB therapy
cont BB as tolerated

## 2023-02-26 NOTE — DISCHARGE NOTE PROVIDER - HOSPITAL COURSE
77y Male PMHx of HTN, HLD, DM presented to ED with c/o L arm numbness, abnormal movement, code stroke called in ED, CT head neg for acute infarct. Labs significant for elevated troponin 1.5. Pt endorsed slight constant chest pressure. States over past month have noticed the pressure more when walking dog. EKG concerning for anterior infarct. Pt given plavix 300mg given and asa, pt now s/p LHC and found to have Severe LM 95 %, LAD 90 %, pCir 70 %, pRCA 80 % mid right 98%, distal right 100% RPDA 95 % Ramus 80 %, Right groin IABP placed in the cath lab and patient started on Heparin gtt. Pt remained hemodynamically stable in CT ICU. Neurology Clearance obtained for cardiac surgery. Pt now s/p C3 L with Dr. Seo on 2/21.  2/24 right CT dc'd. 2/25 pacing wires removed, PB chest tube removed.  Pt is stable from hemodynamic and respiratory standpoint.  Pt is deemed stable for discharge by  77y Male PMHx of HTN, HLD, DM presented to ED with c/o L arm numbness, abnormal movement, code stroke called in ED, CT head neg for acute infarct. Labs significant for elevated troponin 1.5. Pt endorsed slight constant chest pressure. States over past month have noticed the pressure more when walking dog. EKG concerning for anterior infarct. Pt given plavix 300mg given and asa, pt now s/p LHC and found to have Severe LM 95 %, LAD 90 %, pCir 70 %, pRCA 80 % mid right 98%, distal right 100% RPDA 95 % Ramus 80 %, Right groin IABP placed in the cath lab and patient started on Heparin gtt. Pt remained hemodynamically stable in CT ICU. Neurology Clearance obtained for cardiac surgery. Pt now s/p C3 L with Dr. Seo on 2/21.  2/24 right CT dc'd. 2/25 pacing wires removed, PB chest tube removed.    Plan for discharge home today.

## 2023-02-26 NOTE — PROGRESS NOTE ADULT - PROBLEM SELECTOR PLAN 1
.  - s/p LHC- MVD IABP  - now POD #1 CABG LIMA to LAD, SVG to OM, SVG to PL  - Pt states feeling well, no chest pain, difficulty breathing. extubated this am   - Tx as per Ctsx team  - tele - SR, SB. TVP in place rate set at 50  -
Primacor discontinued   Lipitor for chronic graft patency prophylaxis  Aspirin for acute graft closure prophylaxis  Encourage PO intake  Encourage OOB to chair and ambulation with PT  Encourage deep breathing exercised and coughing  Chest PT and IS use with bedside nurse  Pain management  bowel regime
S/P C3L with Dr Seo 2/21  Primacor discontinued 2/22  Left, pericardial CT to WS  CXR daily. Record drainage q12h.  Cont BB therapy  Lipitor for chronic graft patency prophylaxis  Aspirin for acute graft closure prophylaxis  Cont diuretic therapy   Encourage PO intake  Encourage OOB to chair and ambulation with PT  Encourage deep breathing exercised and coughing  Chest PT and IS use with bedside nurse  Pain management  bowel regimen  Cont supportive care    Plan to be discussed with CT Surgery attendings during AM rounds.
S/P C3L with Dr Seo 2/21  Primacor discontinued 2/22  Cont BB therapy  Lipitor for chronic graft patency prophylaxis  Aspirin for acute graft closure prophylaxis  Cont diuretic therapy   Encourage PO intake  Encourage OOB to chair and ambulation with PT  Encourage deep breathing exercised and coughing  Chest PT and IS use with bedside nurse  Pain management  bowel regime  Cont supportive care and discuss case in AM with CTS team.
S/P C3L with Dr Seo 2/21  Primacor discontinued 2/22  Left CT to WS  CXR daily. Record drainage q12h.  Cont BB therapy  Lipitor for chronic graft patency prophylaxis  Aspirin for acute graft closure prophylaxis  Cont diuretic therapy   Encourage PO intake  Encourage OOB to chair and ambulation with PT  Encourage deep breathing exercised and coughing  Chest PT and IS use with bedside nurse  Pain management  bowel regimen  Cont supportive care    Plan to be discussed with CT Surgery attendings during AM rounds.
cont heparin gtt   cont ASA  NPO MN   cont BB as tolerated by HR and BP   Right groin IABP  1:1 augmenting 139   pre-op shower and clip and prep ordered  COVID (-), PRBCs on call to OR with active T & S   F/U UA and Urine cx  PFTS performed FVC 2.30, FEV1 1.20, FEV1/FVC 0.52  TTE as above  US carotids NML  Pre-op w/u complete   tenative OR in the AM 2/21 pending P2y12
Wean IV anti-hypertensive agents as tolerated  Primacor cut in 1/2   Lipitor for chronic graft patency prophylaxis  Aspirin for acute graft closure prophylaxis  Encourage PO intake  Encourage OOB to chair and ambulation with PT  Encourage deep breathing exercised and coughing  Chest PT and IS use with bedside nurse  Pain management  bowel regime

## 2023-02-26 NOTE — DISCHARGE NOTE PROVIDER - PROVIDER TOKENS
PROVIDER:[TOKEN:[[id number]]],PROVIDER:[TOKEN:[[id number]]],PROVIDER:[TOKEN:[[id number]]] PROVIDER:[TOKEN:[[id number]]],PROVIDER:[TOKEN:[[id number]]],PROVIDER:[TOKEN:[[id number]]],PROVIDER:[TOKEN:[[id number]]]

## 2023-02-26 NOTE — PROGRESS NOTE ADULT - PROBLEM SELECTOR PLAN 4
.  - pod #1, cont to monitor
cont lipitor

## 2023-02-26 NOTE — PROGRESS NOTE ADULT - REASON FOR ADMISSION
Arm numbness
Arm numbness , NSTEMI , Severe LM and 4 vessel CAD , IABP
Arm numbness
Arm numbness , CAD
Arm numbness

## 2023-02-26 NOTE — PROGRESS NOTE ADULT - PROBLEM SELECTOR PROBLEM 5
Type 2 diabetes mellitus
HLD (hyperlipidemia)
Type 2 diabetes mellitus

## 2023-02-26 NOTE — DISCHARGE NOTE PROVIDER - [id number]_CORE_FT
Adrián Soe  [facility]  [facility] [address]  Scheduled Appointment: 03/10/2023

## 2023-02-26 NOTE — PROGRESS NOTE ADULT - SUBJECTIVE AND OBJECTIVE BOX
[facility]                                                         CARDIOLOGY AT [place] [address]                                                         Telephone: [phone]. Fax:[phone]                                                                             PROGRESS NOTE    Reason for follow up: NSTEMI, multivessel disease  Update: s/p 3 vessel CABG yesterday LIMA to MAD, SVG to OM, SVG to PL  IABP 1:1, swan, TLC, chest tubes, lee.   Transfusion in progress.   Pt extubated this am. States fell "fine". Denies chest pain, shortness of breath.         Review of symptoms:   Cardiac:  No chest pain. No dyspnea. No palpitations.  Respiratory: no cough. No dyspnea  Gastrointestinal: No diarrhea. No abdominal pain. No bleeding.   Neuro: No focal neuro complaints.        Vitals:  T(C): 37.2 (02-22-23 @ 08:00), Max: 37.8 (02-22-23 @ 00:00)  HR: 83 (02-22-23 @ 09:00) (62 - 90)  RR: 18 (02-22-23 @ 09:00) (10 - 18)  SpO2: 98% (02-22-23 @ 09:00) (95% - 100%)        I&O's Summary    21 Feb 2023 07:01  -  22 Feb 2023 07:00  --------------------------------------------------------  IN: 3674.4 mL / OUT: 3975 mL / NET: -300.6 mL    22 Feb 2023 07:01  -  22 Feb 2023 09:45  --------------------------------------------------------  IN: 569.6 mL / OUT: 175 mL / NET: 394.6 mL      Weight (kg): 80.2 (02-21 @ 05:52)      PHYSICAL EXAM:  Appearance: Comfortable. No acute distress  HEENT:  Atraumatic. Normocephalic.  Normal oral mucosa  Neurologic: A & O x 3, no gross focal deficits.  Cardiovascular: RRR S1 S2, No murmur, no rubs/gallops. No JVD  Respiratory: Lungs clear to auscultation, unlabored, chest tube x 3, TVP swan, TLC R side neck  Gastrointestinal:  Soft, Non-tender, + BS  Lower Extremities: No edema, IABP R groin 1:1  Psychiatry: Patient is calm. No agitation.   Skin: warm and dry.        CURRENT CARDIAC MEDICATIONS:  milrinone Infusion 0.2 MICROgram(s)/kG/Min IV Continuous <Continuous>        CURRENT OTHER MEDICATIONS:  cefuroxime  IVPB 1500 milliGRAM(s) IV Intermittent every 8 hours  vancomycin  IVPB 1250 milliGRAM(s) IV Intermittent every 12 hours  acetaminophen     Tablet .. 975 milliGRAM(s) Oral every 6 hours  DULoxetine 60 milliGRAM(s) Oral daily  HYDROmorphone  Injectable 0.5 milliGRAM(s) IV Push every 3 hours PRN break through  ondansetron Injectable 4 milliGRAM(s) IV Push every 4 hours PRN Nausea and/or Vomiting  oxyCODONE    IR 10 milliGRAM(s) Oral every 4 hours PRN Severe Pain (7 - 10)  oxyCODONE    IR 5 milliGRAM(s) Oral every 4 hours PRN Moderate Pain (4 - 6)  pantoprazole    Tablet 40 milliGRAM(s) Oral daily  polyethylene glycol 3350 17 Gram(s) Oral daily  senna 2 Tablet(s) Oral at bedtime  atorvastatin 80 milliGRAM(s) Oral at bedtime  insulin lispro Injectable (ADMELOG) 2 Unit(s) SubCutaneous three times a day before meals  insulin regular Infusion 4 Unit(s)/Hr (4 mL/Hr) IV Continuous <Continuous>  albumin human  5% IVPB 250 milliLiter(s) IV Intermittent every 30 minutes, Stop order after: 2 Doses PRN hypovolemia  aspirin enteric coated 81 milliGRAM(s) Oral daily  chlorhexidine 2% Cloths 1 Application(s) Topical two times a day  enoxaparin Injectable 40 milliGRAM(s) SubCutaneous every 24 hours  lidocaine   4% Patch 1 Patch Transdermal daily          LABS:	 	  ( 22 Feb 2023 02:10 )  Troponin T  1.40<H>,  CPK  343<H>, CKMB  X    , BNP X      , ( 21 Feb 2023 14:02 )  Troponin T  3.04<H>,  CPK  226<H>, CKMB  X    , BNP X      , ( 20 Feb 2023 18:19 )  Troponin T  1.66<H>,  CPK  149  , CKMB  X    , BNP 1834<H>                            8.5    10.72 )-----------( 151      ( 22 Feb 2023 02:10 )             25.2     02-22    140  |  105  |  15.3  ----------------------------<  112<H>  3.9   |  23.0  |  0.81    Ca    8.6      22 Feb 2023 02:10  Mg     1.9     02-22    TPro  5.7<L>  /  Alb  3.9  /  TBili  0.5  /  DBili  x   /  AST  42<H>  /  ALT  13  /  AlkPhos  53  02-22    PT/INR/PTT ( 22 Feb 2023 02:10 )                       :                       :      13.6         :       26.8                  .        .                   .              .           .       1.17        .                                       Lipid Profile: Date: 02-21 @ 02:30  Total cholesterol 105; Direct LDL: --; HDL: 34; Triglycerides:112  Date: 02-20 @ 18:19  Total cholesterol 114; Direct LDL: --; HDL: 39; Triglycerides:134      TSH: Thyroid Stimulating Hormone, Serum: 1.30 uIU/mL  Thyroid Stimulating Hormone, Serum: 1.45 uIU/mL        TELEMETRY: SR, SB        DIAGNOSTIC TESTING:  [ ] Echocardiogram:   < from: TTE Echo Complete w/ Contrast w/ Doppler (02.20.23 @ 14:56) >    PHYSICIAN INTERPRETATION:  Left Ventricle: Endocardial visualization was enhanced with intravenous   echo contrast. The left ventricular internal cavity size is mildly   increased.  Global LV systolic function was severely decreased. Left ventricular   ejection fraction, by visual estimation, is 25 to 30%. Spectral Doppler   shows pseudonormal pattern of left ventricular myocardial filling (Grade   II diastolic dysfunction). Sludge in the LV apex. No evidence of thrombus.  Right Ventricle: Normal right ventricular size and function.  Left Atrium: The left atrium is normal in size.  Right Atrium: The right atrium is normal in size.  Pericardium: There is no evidence of pericardial effusion.  Mitral Valve: Structurally normal mitral valve, with normal leaflet   excursion. Mild mitral valve regurgitation is seen.  Tricuspid Valve: The tricuspid valve is normal in structure. Trivial   tricuspid regurgitation is visualized.  Aortic Valve: The aortic valve is trileaflet. No evidence of aortic valve   regurgitation is seen.  Pulmonic Valve: The pulmonic valve was not well visualized.  Aorta: The aortic root and ascending aorta are structurally normal, with   no evidence of dilitation.  Pulmonary Artery: The main pulmonary artery is normal in size.  Venous: The inferior vena cava was normal sized, with respiratory size   variation less than 50%.      Summary:   1. Endocardial visualization was enhanced with intravenous echo contrast.   2. The left atrium is normal in size.   3. Global diffuse akinesis. Left ventricular ejection fraction, by   visual estimation, is 25 to 30%.   4. Sludge in the LV apex. No evidence of thrombus.   5. The right atrium is normal in size.   6. Normal right ventricular size and function.   7. Mild mitral valve regurgitation.   8. There is no evidence of pericardial effusion.    MD Carito, RPVI Electronically signed on 2/20/2023 at 6:22:35 PM    < end of copied text >    
  ERNESTINA BRAGG  MRN#: [id number]  Subjective: The patient was in the CTICU in critical condition at risk for imminent decompensation and was seen and evaluate on AM rounds with the entire multidisciplinary team.     OBJECTIVE:  ICU Vital Signs Last 24 Hrs  T(C): 37 (2023 04:00), Max: 37.8 (2023 16:00)  T(F): 98.6 (2023 04:00), Max: 100 (2023 16:00)  HR: 68 (2023 07:00) (68 - 93)  BP: 138/75 (2023 07:00) (109/62 - 156/69)  BP(mean): 100 (:) (81 - 102)  ABP: 178/49 (2023 04:00) (106/49 - 178/49)  ABP(mean): 86 (:00) (69 - 96)  RR: 18 (:) (17 - 22)  SpO2: 99% (:) (91% - 99%)    O2 Parameters below as of :  Patient On (Oxygen Delivery Method): nasal cannula  O2 Flow (L/min): 2  I&O's Summary    2023 07:01  -  2023 07:00  --------------------------------------------------------  IN: 2799 mL / OUT: 2130 mL / NET: 669 mL    2023 07:01  -  2023 08:24  --------------------------------------------------------  IN: 15 mL / OUT: 70 mL / NET: -55 mL        PHYSICAL EXAM:Daily     Daily Weight in k (2023 04:00)  General: WN/WD NAD    HEENT:     + NCAT  + EOMI  - Conjuctival edema   - Icterus   - Thrush   - ETT  - NGT/OGT  Neck:         + FROM    + JVD     - Nodes     - Masses    + Mid-line trachea   - Tracheostomy  Chest:         - Sternal click  - Sternal drainage  + Pacing wires  + Chest tubes  - SubQ emphysema  Lungs:          + CTA   - Rhonchi    - Rales    - Wheezing     - Decreased BS   - Dullness R L  Cardiac:       + S1 + S2    + RRR   - Irregular   - S3  - S4    - Murmurs   - Rub   - Hamman’s sign   Abdomen:    + BS     + Soft    + Non-tender     - Distended    - Organomegaly  - PEG  Extremities:   - Cyanosis U/L   - Clubbing  U/L  - LE/UE Edema   + Capillary refill    + Pulses   Neuro:        + Awake   +  Alert   - Confused   - Lethargic   - Sedated   - Generalized Weakness  Skin:        - Rashes    - Erythema   + Normal incisions   + IV sites intact  - Sacral decubitus    HOSPITAL MEDICATIONS: All mediciations reviewed and analyzed  MEDICATIONS  (STANDING):  aspirin enteric coated 81 milliGRAM(s) Oral daily  atorvastatin 80 milliGRAM(s) Oral at bedtime  DULoxetine 60 milliGRAM(s) Oral daily  enoxaparin Injectable 40 milliGRAM(s) SubCutaneous every 24 hours  glucagon  Injectable 1 milliGRAM(s) IntraMuscular once  insulin glargine Injectable (LANTUS) 16 Unit(s) SubCutaneous at bedtime  insulin lispro (ADMELOG) corrective regimen sliding scale   SubCutaneous three times a day before meals  insulin lispro Injectable (ADMELOG) 2 Unit(s) SubCutaneous three times a day before meals  lidocaine   4% Patch 1 Patch Transdermal daily  metoprolol tartrate 25 milliGRAM(s) Oral two times a day  pantoprazole    Tablet 40 milliGRAM(s) Oral daily  polyethylene glycol 3350 17 Gram(s) Oral daily  senna 2 Tablet(s) Oral at bedtime  torsemide 20 milliGRAM(s) Oral daily    MEDICATIONS  (PRN):  acetaminophen     Tablet .. 650 milliGRAM(s) Oral every 6 hours PRN Mild Pain (1 - 3)  albumin human  5% IVPB 250 milliLiter(s) IV Intermittent every 30 minutes PRN hypovolemia  dextrose Oral Gel 15 Gram(s) Oral once PRN Blood Glucose LESS THAN 70 milliGRAM(s)/deciliter  ondansetron Injectable 4 milliGRAM(s) IV Push every 4 hours PRN Nausea and/or Vomiting  oxyCODONE    IR 10 milliGRAM(s) Oral every 4 hours PRN Severe Pain (7 - 10)  oxyCODONE    IR 5 milliGRAM(s) Oral every 4 hours PRN Moderate Pain (4 - 6)      LABS: All Lab data reviewed and analyzed                        8.5    10.72 )-----------( 151      ( 2023 02:10 )             25.2        140  |  105  |  15.3  ----------------------------<  112<H>  3.9   |  23.0  |  0.81    Ca    8.6      2023 02:10  Mg     1.9         TPro  5.7<L>  /  Alb  3.9  /  TBili  0.5  /  DBili  x   /  AST  42<H>  /  ALT  13  /  AlkPhos  53      PT/INR - ( 2023 02:10 )   PT: 13.6 sec;   INR: 1.17 ratio         PTT - ( 2023 02:10 )  PTT:26.8 sec   ABG - ( 2023 00:15 )  pH, Arterial: 7.460 pH, Blood: x     /  pCO2: 43    /  pO2: 86    / HCO3: 31    / Base Excess: 6.8   /  SaO2: 99.7      RADIOLOGY: - Reviewed and analyzed     Assessment: Respiratory insufficieny, cardiogenic shock-cardiovascular dysfunction and hyperglycemia,     Plan:   - Respiratory status required supplemental oxygen and the following of continuous pulse oximetry for support & to prevent decompensation  - Invasive hemodynamic monitoring with an A-line was initially required for the following of continuous MAP/BP monitoring to ensure adequate cardiovascular support and to evaluate for & help prevent decompensation while receiving intermittent volume expansion, recent IABP removal, recent cessation of the IV Primacor drip, and recent initiation of Lopressor  - Patient requires deresuscitation for perioperative fluid administration and increase weight, with enteral Torsemide   - Addressed analgesic regimen to optimize function  - ASA continued for graft occlusion-thromboembolism prophylaxis  - Lipitor for long term graft patency  - Lovenox to be started for VTE prophylaxis in addition to Venodyne boots  - Protonix maintained for GI bleeding prophylaxis  - Lopressor for atrial fibrillation prophylaxis due to Primacor  - Metabolic stability & infection prophylaxis required review and adjustment of regular Insulin sliding scale, Lantus, and gylcemic regimen while following serial glucose levels to help achieve & maintain euglycemia      Patient required critical care management and is at risk for life threatening decompensation  I provided 40 minutes of non-continuous care to the patient.  
Brief summary:  NSTEMI - Cath [place] disease     SUBJECTIVE:  Pt in bed with Right groin IABP on heparin gtt. Pt states, " I feel ok"  Patient denies acute pain with radiating or aggravating factors.  He denies chest pain, shortness of breath, palpitations, headache, dizziness, nausea, or vomiting.       Overnight events:  no acute overnight events     PAST MEDICAL & SURGICAL HISTORY:  HTN (hypertension)      HLD (hyperlipidemia)      Post traumatic stress disorder (PTSD)          MEDICATIONS  aspirin enteric coated 81 milliGRAM(s) Oral daily  atorvastatin 40 milliGRAM(s) Oral at bedtime  cefuroxime  IVPB 1500 milliGRAM(s) IV Intermittent once  chlorhexidine 0.12% Liquid 15 milliLiter(s) Swish and Spit two times a day  chlorhexidine 4% Liquid 1 Application(s) Topical daily  glucagon  Injectable 1 milliGRAM(s) IntraMuscular once  heparin  Infusion.  Unit(s)/Hr IV Continuous <Continuous>  insulin lispro (ADMELOG) corrective regimen sliding scale   SubCutaneous three times a day before meals  melatonin 5 milliGRAM(s) Oral at bedtime  metoprolol tartrate 12.5 milliGRAM(s) Oral two times a day  pantoprazole    Tablet 40 milliGRAM(s) Oral before breakfast  sodium chloride 0.9% lock flush 3 milliLiter(s) IV Push every 8 hours  vancomycin  IVPB 1250 milliGRAM(s) IV Intermittent once  MEDICATIONS  (PRN):  dextrose Oral Gel 15 Gram(s) Oral once PRN Blood Glucose LESS THAN 70 milliGRAM(s)/deciliter    Height (cm): 188 (02-20 @ 12:14)  Weight (kg): 80.2 (02-20 @ 14:50)  BMI (kg/m2): 22.7 (02-20 @ 14:50)  BSA (m2): 2.06 (02-20 @ 14:50)  Daily Height in cm: 187.96 (20 Feb 2023 12:14)    Daily                               14.4   9.71  )-----------( 147      ( 20 Feb 2023 21:50 )             42.5   02-20    137  |  101  |  17.7  ----------------------------<  131<H>  4.0   |  22.0  |  0.84    Ca    9.5      20 Feb 2023 18:19    TPro  6.7  /  Alb  4.1  /  TBili  0.4  /  DBili  x   /  AST  49<H>  /  ALT  21  /  AlkPhos  82  02-20    CARDIAC MARKERS ( 20 Feb 2023 18:19 )  x     / 1.66 ng/mL / 149 U/L / x     / x      CARDIAC MARKERS ( 20 Feb 2023 16:10 )  x     / 1.39 ng/mL / 152 U/L / x     / 10.7 ng/mL  CARDIAC MARKERS ( 20 Feb 2023 12:27 )  x     / 1.50 ng/mL / x     / x     / x        PT/INR - ( 20 Feb 2023 18:19 )   PT: 11.8 sec;   INR: 1.02 ratio         PTT - ( 20 Feb 2023 21:50 )  PTT:46.6 sec      Objective:  T(C): 37.3 (02-20-23 @ 17:00), Max: 37.3 (02-20-23 @ 17:00)  HR: 57 (02-21-23 @ 02:15) (51 - 127)  BP: 127/78 (02-21-23 @ 02:00) (115/69 - 162/100)  RR: 13 (02-21-23 @ 02:15) (12 - 28)  SpO2: 96% (02-21-23 @ 02:15) (94% - 99%)  Wt(kg): --CAPILLARY BLOOD GLUCOSE      POCT Blood Glucose.: 144 mg/dL (20 Feb 2023 18:52)  POCT Blood Glucose.: 260 mg/dL (20 Feb 2023 12:17)  I&O's Summary    20 Feb 2023 07:01  -  21 Feb 2023 02:32  --------------------------------------------------------  IN: 82 mL / OUT: 1450 mL / NET: -1368 mL        Physical Exam  Neuro: A+O x 3, non-focal, speech clear and intact  HEENT:  NCAT, PERRL, EOMI. No conjuctival edema or icterus, no thrush.  Neck: supple, trachea midline  Pulm: CTA, good air entry, equal bilaterally, no rales/rhonchi/wheezing, no accessory muscle use noted  CV: regular rate, regular rhythm, +S1S2, no murmur or rub noted  Abd: soft, NT, ND, + BS  Ext: GUTIERREZ x 4, no edema, no cyanosis or clubbing, R groin IABP, 2+ DP b/l, distal motor/neuro/circ intact. Right radial band in place   Skin: warm, dry, well perfused      Imaging:  TTE:  < from: TTE Echo Complete w/ Contrast w/ Doppler (02.20.23 @ 14:56) >  PHYSICIAN INTERPRETATION:  Left Ventricle: Endocardial visualization was enhanced with intravenous   echo contrast. The left ventricular internal cavity size is mildly   increased.  Global LV systolic function was severely decreased. Left ventricular   ejection fraction, by visual estimation, is 25 to 30%. Spectral Doppler   shows pseudonormal pattern of left ventricular myocardial filling (Grade   II diastolic dysfunction). Sludge in the LV apex. No evidence of thrombus.  Right Ventricle: Normal right ventricular size and function.  Left Atrium: The left atrium is normal in size.  Right Atrium: The right atrium is normal in size.  Pericardium: There is no evidence of pericardial effusion.  Mitral Valve: Structurally normal mitral valve, with normal leaflet   excursion. Mild mitral valve regurgitation is seen.  Tricuspid Valve: The tricuspid valve is normal in structure. Trivial   tricuspid regurgitation is visualized.  Aortic Valve: The aortic valve is trileaflet. No evidence of aortic valve   regurgitation is seen.  Pulmonic Valve: The pulmonic valve was not well visualized.  Aorta: The aortic root and ascending aorta are structurally normal, with   no evidence of dilitation.  Pulmonary Artery: The main pulmonary artery is normal in size.  Venous: The inferior vena cava was normal sized, with respiratory size   variation less than 50%.      Summary:   1. Endocardial visualization was enhanced with intravenous echo contrast.   2. The left atrium is normal in size.   3. Global diffuse akinesis. Left ventricular ejection fraction, by   visual estimation, is 25 to 30%.   4. Sludge in the LV apex. No evidence of thrombus.   5. The right atrium is normal in size.   6. Normal right ventricular size and function.   7. Mild mitral valve regurgitation.   8. There is no evidence of pericardial effusion.    MD Carito, RPVI Electronically signed on 2/20/2023 at 6:22:35 PM            *** Final ***    < end of copied text >    ECG:    < from: 12 Lead ECG (02.20.23 @ 17:13) >    Ventricular Rate 63 BPM    Atrial Rate 63 BPM    P-R Interval 196 ms    QRS Duration 104 ms    Q-T Interval 390 ms    QTC Calculation(Bazett) 399 ms    P Axis 56 degrees    R Axis 46 degrees    T Axis 105 degrees    Diagnosis Line Normal sinus rhythm  Possible Left atrial enlargement  ST elevation consider inferior injury or acute infarct  ** ** ACUTE MI / STEMI ** **  Abnormal ECG    Confirmed by MACI THOMPSON ([id number]) on 2/20/2023 6:30:03 PM    < end of copied text >        < from: US Duplex Carotid Arteries Complete, Bilateral (02.20.23 @ 22:42) >  ACC: [id number] EXAM:  US DPLX CAROTIDS COMPL BI   ORDERED BY: ALISHA CUTLER     PROCEDURE DATE:  02/20/2023          INTERPRETATION:  CLINICAL INFORMATION: Preop open heart surgery    COMPARISON: CT angiography from the same day    TECHNIQUE: Grayscale, color and spectral Doppler examination of both   carotid arteries was performed.    FINDINGS:    No elevated velocities are encountered. Balloon pump waveforms. Bilateral   intimal thickening with mild bilateral carotid bulb plaque.    Peak systolic velocities are as follows:    RIGHT:  PROX CCA = 68 cm/s  DIST CCA = 55 cm/s  PROX ICA = 46 cm/s  DIST ICA = 71 cm/s  ECA = 83 cm/s    LEFT:  PROX CCA = 70 cm/s  DIST CCA = 75 cm/s  PROX ICA = 55 cm/s  DIST ICA = 57 cm/s  ECA = 69 cm/s    Antegrade flow is noted within both vertebral arteries.    IMPRESSION: No significant hemodynamic stenosis of either carotid artery.    Measurement of carotid stenosis is based on velocity parameters that   correlate the residual internal carotid diameterwith that of the more   distal vessel in accordance with a method such as the [place] American   Symptomatic Carotid Endarterectomy Trial (NASCET).    --- End of Report ---            CARLOS RECINOS MD; Attending Radiologist  This document has been electronically signed. Feb 20 2023 10:56PM    < end of copied text >    < from: Cardiac Catheterization (02.20.23 @ 16:07) >  Diagnostic Findings:     Coronary Angiography   The coronary circulation is right dominant. Cardiac catheterization  was performed emergently.    LM   Left main artery: There is a 95 % stenosis. RICHARDSON Flow 3.      LAD   Proximal left anterior descending: There is a 90 % stenosis. RICHARDSON Flow  3.    CX   Proximal circumflex: There is a 70 % stenosis.      RCA   Proximal right coronary artery: There is an 80 % stenosis. Mid right  coronary artery: There is a 98 % stenosis. Distal right  coronary artery: There is a 100 % stenosis. Right posterior descending  artery: There is a 95 % stenosis.    Ramus   Ramus intermedius: There is an 80 % stenosis. RICHARDSON Flow 3.      Left Heart Cath   The anterobasal segment is hypokinetic. The apical, diaphragmatic and  posterobasal wall segments are akinetic. Global left    < end of copied text >    
INTERVAL EVENTS:  Follow up diabetes management  Transitioned to basal bolus regimen    ROS: Denies chest pain, sob, abd pain.    MEDICATIONS  (STANDING):  aspirin enteric coated 81 milliGRAM(s) Oral daily  atorvastatin 40 milliGRAM(s) Oral at bedtime  chlorhexidine 2% Cloths 1 Application(s) Topical two times a day  dextrose 5%. 1000 milliLiter(s) (50 mL/Hr) IV Continuous <Continuous>  dextrose 5%. 1000 milliLiter(s) (100 mL/Hr) IV Continuous <Continuous>  dextrose 50% Injectable 25 Gram(s) IV Push once  dextrose 50% Injectable 12.5 Gram(s) IV Push once  dextrose 50% Injectable 25 Gram(s) IV Push once  dextrose 50% Injectable 50 milliLiter(s) IV Push every 15 minutes  dextrose 50% Injectable 25 milliLiter(s) IV Push every 15 minutes  dextrose 50% Injectable 50 milliLiter(s) IV Push every 15 minutes  dextrose 50% Injectable 25 milliLiter(s) IV Push every 15 minutes  DULoxetine 60 milliGRAM(s) Oral daily  enoxaparin Injectable 40 milliGRAM(s) SubCutaneous every 24 hours  glucagon  Injectable 1 milliGRAM(s) IntraMuscular once  insulin glargine Injectable (LANTUS) 16 Unit(s) SubCutaneous at bedtime  insulin lispro (ADMELOG) corrective regimen sliding scale   SubCutaneous three times a day before meals  insulin lispro Injectable (ADMELOG) 2 Unit(s) SubCutaneous three times a day before meals  lidocaine   4% Patch 1 Patch Transdermal daily  metoprolol tartrate 25 milliGRAM(s) Oral two times a day  pantoprazole    Tablet 40 milliGRAM(s) Oral daily  polyethylene glycol 3350 17 Gram(s) Oral daily  senna 2 Tablet(s) Oral at bedtime  sodium chloride 0.9% lock flush 3 milliLiter(s) IV Push every 8 hours  torsemide 20 milliGRAM(s) Oral daily    MEDICATIONS  (PRN):  acetaminophen     Tablet .. 650 milliGRAM(s) Oral every 6 hours PRN Mild Pain (1 - 3)  albumin human  5% IVPB 250 milliLiter(s) IV Intermittent every 30 minutes PRN hypovolemia  dextrose Oral Gel 15 Gram(s) Oral once PRN Blood Glucose LESS THAN 70 milliGRAM(s)/deciliter  ondansetron Injectable 4 milliGRAM(s) IV Push every 4 hours PRN Nausea and/or Vomiting  oxyCODONE    IR 10 milliGRAM(s) Oral every 4 hours PRN Severe Pain (7 - 10)  oxyCODONE    IR 5 milliGRAM(s) Oral every 4 hours PRN Moderate Pain (4 - 6)    Allergies  No Known Allergies    Vital Signs Last 24 Hrs  T(C): 37.2 (23 Feb 2023 12:45), Max: 37.8 (22 Feb 2023 16:00)  T(F): 99 (23 Feb 2023 12:45), Max: 100 (22 Feb 2023 16:00)  HR: 82 (23 Feb 2023 12:45) (66 - 92)  BP: 125/67 (23 Feb 2023 12:45) (109/62 - 156/69)  BP(mean): 90 (23 Feb 2023 12:00) (81 - 102)  RR: 18 (23 Feb 2023 12:45) (17 - 22)  SpO2: 96% (23 Feb 2023 12:45) (91% - 99%)    Parameters below as of 23 Feb 2023 12:45  Patient On (Oxygen Delivery Method): room air    PHYSICAL EXAM:  General: No apparent distress  Neck: Supple, trachea midline, no thyromegaly  Respiratory: Lungs clear bilaterally, normal rate, effort  Cardiac: +S1, S2, no m/r/g  GI: +BS, soft, non tender, non distended  Extremities: No peripheral edema, no pedal lesions  Neuro: A+O X3, no tremor      LABS:                        9.9    13.14 )-----------( 140      ( 23 Feb 2023 02:12 )             29.5     02-23    140  |  107  |  21.4<H>  ----------------------------<  114<H>  4.2   |  23.0  |  0.79    Ca    8.1<L>      23 Feb 2023 02:12  Mg     2.4     02-23    TPro  5.7<L>  /  Alb  3.9  /  TBili  0.5  /  DBili  x   /  AST  42<H>  /  ALT  13  /  AlkPhos  53  02-22      POCT Blood Glucose.: 219 mg/dL (02-23-23 @ 11:23)  POCT Blood Glucose.: 147 mg/dL (02-23-23 @ 08:11)  POCT Blood Glucose.: 135 mg/dL (02-23-23 @ 04:14)  POCT Blood Glucose.: 118 mg/dL (02-23-23 @ 03:49)  POCT Blood Glucose.: 119 mg/dL (02-23-23 @ 01:57)  POCT Blood Glucose.: 132 mg/dL (02-23-23 @ 00:05)  POCT Blood Glucose.: 150 mg/dL (02-22-23 @ 22:57)        Triiodothyronine, Total (T3 Total): 97 ng/dL (02-21-23 @ 02:30)  Thyroid Stimulating Hormone, Serum: 1.30 uIU/mL (02-21-23 @ 02:30)  Thyroid Stimulating Hormone, Serum: 1.45 uIU/mL (02-20-23 @ 18:19)  
  ERNESTINA BRAGG  MRN#: [id number]  Subjective: The patient was in the CTICU in critical condition at risk for imminent decompensation and was seen and evaluate on AM rounds with the entire multidisciplinary team.     OBJECTIVE:  ICU Vital Signs Last 24 Hrs  T(C): 37.2 (2023 08:00), Max: 37.8 (2023 00:00)  T(F): 99 (2023 08:00), Max: 100 (2023 00:00)  HR: 89 (2023 11:45) (62 - 93)  BP: --  BP(mean): --  ABP: 132/53 (2023 11:45) (96/32 - 160/49)  ABP(mean): 79 (2023 11:45) (57 - 96)  RR: 18 (2023 11:30) (10 - 20)  SpO2: 93% (2023 11:45) (92% - 100%)    O2 Parameters below as of 2023 11:00  Patient On (Oxygen Delivery Method): room air             @ 07:  -   @ 07:00  --------------------------------------------------------  IN: 3674.4 mL / OUT: 3975 mL / NET: -300.6 mL     @ 07:01   @ 11:54  --------------------------------------------------------  IN: 862.2 mL / OUT: 380 mL / NET: 482.2 mL        PHYSICAL EXAM:Daily     Daily Weight in k (2023 04:00)  General: WN/WD NAD    HEENT:     + NCAT  + EOMI  - Conjuctival edema   - Icterus   - Thrush   - ETT  - NGT/OGT  Neck:         + FROM    + JVD     - Nodes     - Masses    + Mid-line trachea   - Tracheostomy  Chest:         - Sternal click  - Sternal drainage  + Pacing wires  + Chest tubes  - SubQ emphysema  Lungs:          + CTA   - Rhonchi    - Rales    - Wheezing     - Decreased BS   - Dullness R L  Cardiac:       + S1 + S2    + RRR   - Irregular   - S3  - S4    - Murmurs   - Rub   - Hamman’s sign   Abdomen:    + BS     + Soft    + Non-tender     - Distended    - Organomegaly  - PEG  Extremities:   - Cyanosis U/L   - Clubbing  U/L  - LE/UE Edema   + Capillary refill    + Pulses   Neuro:        + Awake   +  Alert   - Confused   - Lethargic   - Sedated   - Generalized Weakness  Skin:        - Rashes    - Erythema   + Normal incisions   + IV sites intact  - Sacral decubitus    HOSPITAL MEDICATIONS: All mediciations reviewed and analyzed  MEDICATIONS  (STANDING):  acetaminophen     Tablet .. 975 milliGRAM(s) Oral every 6 hours  aspirin enteric coated 81 milliGRAM(s) Oral daily  atorvastatin 80 milliGRAM(s) Oral at bedtime  cefuroxime  IVPB 1500 milliGRAM(s) IV Intermittent every 8 hours  chlorhexidine 2% Cloths 1 Application(s) Topical two times a day  dextrose 50% Injectable 50 milliLiter(s) IV Push every 15 minutes  dextrose 50% Injectable 25 milliLiter(s) IV Push every 15 minutes  dextrose 50% Injectable 50 milliLiter(s) IV Push every 15 minutes  dextrose 50% Injectable 25 milliLiter(s) IV Push every 15 minutes  DULoxetine 60 milliGRAM(s) Oral daily  enoxaparin Injectable 40 milliGRAM(s) SubCutaneous every 24 hours  insulin lispro Injectable (ADMELOG) 2 Unit(s) SubCutaneous three times a day before meals  insulin regular Infusion 4 Unit(s)/Hr (4 mL/Hr) IV Continuous <Continuous>  lidocaine   4% Patch 1 Patch Transdermal daily  milrinone Infusion 0.2 MICROgram(s)/kG/Min (4.81 mL/Hr) IV Continuous <Continuous>  pantoprazole    Tablet 40 milliGRAM(s) Oral daily  polyethylene glycol 3350 17 Gram(s) Oral daily  senna 2 Tablet(s) Oral at bedtime  sodium chloride 0.9%. 1000 milliLiter(s) (10 mL/Hr) IV Continuous <Continuous>  sodium chloride 0.9%. 1000 milliLiter(s) (5 mL/Hr) IV Continuous <Continuous>  vancomycin  IVPB 1250 milliGRAM(s) IV Intermittent every 12 hours    MEDICATIONS  (PRN):  albumin human  5% IVPB 250 milliLiter(s) IV Intermittent every 30 minutes PRN hypovolemia  ondansetron Injectable 4 milliGRAM(s) IV Push every 4 hours PRN Nausea and/or Vomiting  oxyCODONE    IR 10 milliGRAM(s) Oral every 4 hours PRN Severe Pain (7 - 10)  oxyCODONE    IR 5 milliGRAM(s) Oral every 4 hours PRN Moderate Pain (4 - 6)    Adult Advanced Hemodynamics Last 24 Hrs  CVP(mm Hg): 12 (2023 11:45) (2 - 346)  CVP(cm H2O): --  CO: 6.2 (2023 11:00) (4.2 - 7.2)  CI: 3 (2023 11:00) (2 - 3.4)  PA:  (2023 11:45) (12/5 - 74/2)  PA(mean): 26 (2023 11:45) (9 - 29)  PCWP: --  SVR: 1030 (2023 11:00) (579 - 1578)  SVRI: 2130 (2023 11:00) (1198 - 3315)    LABS: All Lab data reviewed and analyzed                        8.5    10.72 )-----------( 151      ( 2023 02:10 )             25.2        140  |  105  |  15.3  ----------------------------<  112<H>  3.9   |  23.0  |  0.81    Ca    8.6      2023 02:10  Mg     1.9         TPro  5.7<L>  /  Alb  3.9  /  TBili  0.5  /  DBili  x   /  AST  42<H>  /  ALT  13  /  AlkPhos  53  0222    PT/INR - ( 2023 02:10 )   PT: 13.6 sec;   INR: 1.17 ratio         PTT - ( 2023 02:10 )  PTT:26.8 sec   ABG - ( 2023 00:15 )  pH, Arterial: 7.460 pH, Blood: x     /  pCO2: 43    /  pO2: 86    / HCO3: 31    / Base Excess: 6.8   /  SaO2: 99.7      RADIOLOGY: - Reviewed and analyzed     Assessment: Respiratory insufficieny, cardiogenic shock-cardiovascular dysfunction and hyperglycemia,     Plan:   - Respiratory status required supplemental oxygen and the following of continuous pulse oximetry for support & to prevent decompensation  - Invasive hemodynamic monitoring with a PA catheter & an A-line were required for the following of serial CI’s/MVO2’s & continuous MAP/BP monitoring to ensure adequate cardiovascular support and to evaluate for & help prevent decompensation while receiving intermittent volume expansion, an IABP and an IV Primacor drip. Remove IABP  - Patient requires deresuscitation for perioperative fluid administration and increase weight  - Addressed analgesic regimen to optimize function  - ASA continued for graft occlusion-thromboembolism prophylaxis  - Lipitor for long term graft patency  - Lovenox to be started for VTE prophylaxis in addition to Venodyne boots  - Protonix maintained for GI bleeding prophylaxis  - Lopressor held for atrial fibrillation prophylaxis due to Primacor  - Metabolic stability & infection prophylaxis required review and adjustment of regular Insulin sliding scale and gylcemic regimen while following serial glucose levels to help achieve & maintain euglycemia  - Reviewed & addressed surgical site infection prophylaxis regimen    Patient required critical care management and is at risk for life threatening decompensation  I provided 105 minutes of non-continuous care to the patient.  
ERNESTINA BRAGG  MRN-[id number]    HPI:  78 yo male with PMHs of HTN,  HLD,  DM presented to ED with c/o L arm numbness, abnormal movement since this morning. In ED CODE Stroke called. CT head neg for acute infarct.   Labs significant for elevated troponin 1.5 and abnormal EKG. Pt reported c/o chest pressure on exertion for the past couple of weeks.   He denies any headache, fever, dizziness, SOB, palpitations, nausea, vomiting, abdominal pain, change in urinary or bowel habits.   During my encounter, pt had no chest pain and was on his way to cath lab for cardiac cauterization    (2023 16:30)      Surgery/Hospital Course:  ·  PRE-OP DIAGNOSIS:  CAD (coronary artery disease)  ·  POST-OP DIAGNOSIS:  CAD (coronary artery disease)   ·  PROCEDURES:  CABG, with saphenous vein graft 2023   C3L (lima to lad svg to om2 svg to pl with endoscopic right greater saphenous vein harvesting)       ICU Vital Signs Last 24 Hrs  T(C): 37.3 (2023 08:00), Max: 37.3 (2023 17:00)  T(F): 99.1 (2023 08:00), Max: 99.1 (2023 17:00)  HR: 76 (2023 13:55) (51 - 127)  BP: 128/86 (2023 08:00) (112/70 - 162/100)  BP(mean): 101 (2023 08:00) (85 - 122)  ABP: --  ABP(mean): --  RR: 14 (2023 08:00) (9 - 28)  SpO2: 100% (2023 13:55) (90% - 100%)    O2 Parameters below as of 2023 08:00  Patient On (Oxygen Delivery Method): room air            Physical Exam:  Gen: sedated  CNS: non focal 	  Neck: no JVD  RES : clear , no wheezing              CVS: Regular  rhythm. Normal S1/S2  Abd: Soft, non-distended. Bowel sounds present.  Skin: No rash.  Ext:  no edema , IABP    ============================I/O===========================   I&O's Detail    2023 07:01  -  2023 07:00  --------------------------------------------------------  IN:    Heparin Infusion: 104 mL  Total IN: 104 mL    OUT:    Indwelling Catheter - Urethral (mL): 890 mL    Voided (mL): 800 mL  Total OUT: 1690 mL    Total NET: -1586 mL      2023 07:01  -  2023 14:08  --------------------------------------------------------  IN:  Total IN: 0 mL    OUT:    Indwelling Catheter - Urethral (mL): 100 mL  Total OUT: 100 mL    Total NET: -100 mL        ============================ LABS =========================                        12.2   8.77  )-----------( 145      ( 2023 02:30 )             35.7     -    136  |  101  |  17.3  ----------------------------<  171<H>  3.9   |  21.0<L>  |  0.82    Ca    9.4      2023 02:30    TPro  6.1<L>  /  Alb  3.6  /  TBili  0.4  /  DBili  x   /  AST  43<H>  /  ALT  18  /  AlkPhos  73  02-21    LIVER FUNCTIONS - ( 2023 02:30 )  Alb: 3.6 g/dL / Pro: 6.1 g/dL / ALK PHOS: 73 U/L / ALT: 18 U/L / AST: 43 U/L / GGT: x           PT/INR - ( 2023 18:19 )   PT: 11.8 sec;   INR: 1.02 ratio         PTT - ( 2023 21:50 )  PTT:46.6 sec  ABG - ( 2023 12:24 )  pH, Arterial: 7.420 pH, Blood: x     /  pCO2: 37    /  pO2: 370   / HCO3: 24    / Base Excess: -0.5  /  SaO2: 99.8              Urinalysis Basic - ( 2023 21:50 )    Color: Yellow / Appearance: Clear / S.010 / pH: x  Gluc: x / Ketone: Trace  / Bili: Negative / Urobili: Negative   Blood: x / Protein: Negative / Nitrite: Positive   Leuk Esterase: Trace / RBC: Negative /HPF / WBC 0-2 /HPF   Sq Epi: x / Non Sq Epi: Occasional / Bacteria: Occasional      ======================Micro/Rad/Cardio=================  Culture: Reviewed   CXR: Reviewed  Echo:Reviewed  ======================================================  PAST MEDICAL & SURGICAL HISTORY:  HTN (hypertension)      HLD (hyperlipidemia)      Post traumatic stress disorder (PTSD)        ====================ASSESSMENT ==============  77y Male PMHx of HTN, HLD, DM presented to ED with c/o L arm numbness, abnormal movement, code stroke called in ED, CT head neg for acute infarct. Labs significant for elevated troponin 1.5. Pt endorses slight constant chest pressure. States over past month have noticed the pressure more when walking dog. EKG concerning for anterior infarct. Pt given plavix 300mg given and asa, pt now s/p LHC and found to have Severe LM 95 %, LAD 90 %, pCir 70 %, pRCA 80 % mid right 98%, distal right 100% RPDA 95 % Ramus 80 %, Right jennifer IABP placed in the cath lab and patient started on Heparin gtt. Pt remains hemodynamically stable in CT ICU. Neurology Clearance obtained for cardiac surgery. Repeat P2Y12 in the AM, tentatively going to OR this AM .         --- ACS (acute coronary syndrome).   --- NSTEMI (non-ST elevation myocardial infarction).   ---S/p C3L (lima to lad svg to om2 svg to pl with endoscopic right greater saphenous vein harvesting) 2023  --- HTN (hypertension).   ---HLD (hyperlipidemia).   - --Type 2 diabetes mellitus.   ---Post op Hypovolemia  ---Post op respiratory insufficiency   ---Post traumatic stress disorder (PTSD)  ---S/p Cardiac Catheterization : 23   ----S/p  CT Angio Neck Stroke Protocol w/ IV Cont : 23 -    Plan:  -cont ASA  -cont BB as tolerated by HR and BP when off Primacor  -continue Right groin IABP  1:1   - cont lipitor.    ====================== NEUROLOGY=====================  acetaminophen   IVPB .. 1000 milliGRAM(s) IV Intermittent once  DULoxetine 60 milliGRAM(s) Oral daily  ondansetron Injectable 4 milliGRAM(s) IV Push every 4 hours PRN Nausea and/or Vomiting  propofol Infusion 10 MICROgram(s)/kG/Min (4.81 mL/Hr) IV Continuous <Continuous>    ==================== RESPIRATORY======================  Post op respiratory insufficiency  Mechanical Ventilation:  Mode: AC/ CMV (Assist Control/ Continuous Mandatory Ventilation)  RR (machine): 12  TV (machine): 700  FiO2: 60  PEEP: 5  MAP: 9  PIP: 22      ====================CARDIOVASCULAR==================  Post op Hypovolemia  milrinone Infusion 0.375 MICROgram(s)/kG/Min (9.02 mL/Hr) IV Continuous <Continuous>  niCARdipine Infusion 5 mG/Hr (25 mL/Hr) IV Continuous <Continuous>  norepinephrine Infusion 0.02 MICROgram(s)/kG/Min (3.01 mL/Hr) IV Continuous <Continuous>    ===================HEMATOLOGIC/ONC ===================  Monitor H&H/Plts    aspirin  chewable 81 milliGRAM(s) Oral once    ===================== RENAL =========================  Continue monitoring urine output, I&OS, BUN/Cr     ==================== GASTROINTESTINAL===================  pantoprazole  Injectable 40 milliGRAM(s) IV Push once  polyethylene glycol 3350 17 Gram(s) Oral daily  potassium chloride  10 mEq/50 mL IVPB 10 milliEquivalent(s) IV Intermittent every 1 hour  potassium chloride  10 mEq/50 mL IVPB 10 milliEquivalent(s) IV Intermittent every 1 hour  potassium chloride  10 mEq/50 mL IVPB 10 milliEquivalent(s) IV Intermittent every 1 hour  senna 2 Tablet(s) Oral at bedtime  sodium chloride 0.9%. 1000 milliLiter(s) (10 mL/Hr) IV Continuous <Continuous>  sodium chloride 0.9%. 1000 milliLiter(s) (5 mL/Hr) IV Continuous <Continuous>    =======================    ENDOCRINE  =====================  atorvastatin 80 milliGRAM(s) Oral at bedtime  dextrose 50% Injectable 50 milliLiter(s) IV Push every 15 minutes  dextrose 50% Injectable 25 milliLiter(s) IV Push every 15 minutes  dextrose 50% Injectable 50 milliLiter(s) IV Push every 15 minutes  dextrose 50% Injectable 25 milliLiter(s) IV Push every 15 minutes  insulin regular Infusion 4 Unit(s)/Hr (4 mL/Hr) IV Continuous <Continuous>    ========================INFECTIOUS DISEASE================  cefuroxime  IVPB 1500 milliGRAM(s) IV Intermittent every 8 hours  vancomycin  IVPB 1250 milliGRAM(s) IV Intermittent every 12 hours      -Close hemodynamic , ventilatory and drain monitoring and management per post op routine .  -Monitor Neurologic status ,   -Head of the bed should remain elevated to 45 degrees,  -Monitor adequacy of oxygenation and ventilation and attempt to wean oxygen ,  -Monitor for arrhythmias and monitor parameters for organ perfusion,  -Glycemic control is satisfactory,  -Nutritional goals will be met using po eventually , insure adequate caloric intake and monitor the same ,  -Electrolytes have been repleted as necessary , pain control has been achieved  and wound care has been carried out ,  -Stress ulcer and VTE prophylaxis will be achieved,  -Agressive PT and early mobility and ambulation goals will be met,      I have spent 35 minutes providing acute care for this critically ill patient     Patient requires continuous monitoring with bedside rhythm monitoring, pulse ox monitoring, and intermittent blood gas analysis. Care plan discussed with ICU care team. Patient remained critical and at risk for life threatening decompensation.           
Significant recent/past 24 hr events:  No acute overnight events. Pt remained HD stable without acute complaints.  Pt currently in NAD and denies N/V/D HA, dizziness, blurry vision, numbness/tingling, SOB, cough, chest pain, palpitations, abd pain or urinary sx's.     Subjective:  Review of Systems  ROS negative x 10 systems except as noted above    Patient is a 77y old  Male who presents with a chief complaint of Arm numbness (23 Feb 2023 14:14)    HPI:  78 yo male with PMHs of HTN,  HLD,  DM presented to ED with c/o L arm numbness, abnormal movement since this morning. In ED CODE Stroke called. CT head neg for acute infarct.   Labs significant for elevated troponin 1.5 and abnormal EKG. Pt reported c/o chest pressure on exertion for the past couple of weeks.   He denies any headache, fever, dizziness, SOB, palpitations, nausea, vomiting, abdominal pain, change in urinary or bowel habits.     During my encounter, pt had no chest pain and was on his way to cath lab for cardiac cauterization    (20 Feb 2023 16:30)    PAST MEDICAL & SURGICAL HISTORY:  HTN (hypertension)      HLD (hyperlipidemia)      Post traumatic stress disorder (PTSD)        FAMILY HISTORY:      Vitals   ICU Vital Signs Last 24 Hrs  T(C): 37 (23 Feb 2023 22:26), Max: 37.2 (23 Feb 2023 12:00)  T(F): 98.6 (23 Feb 2023 22:26), Max: 99 (23 Feb 2023 12:00)  HR: 81 (23 Feb 2023 22:26) (66 - 87)  BP: 155/84 (23 Feb 2023 22:26) (125/65 - 156/69)  BP(mean): 90 (23 Feb 2023 12:00) (88 - 102)  ABP: 178/49 (23 Feb 2023 04:00) (161/52 - 178/49)  ABP(mean): 86 (23 Feb 2023 04:00) (81 - 86)  RR: 18 (23 Feb 2023 22:26) (17 - 21)  SpO2: 97% (23 Feb 2023 22:26) (92% - 99%)    O2 Parameters below as of 23 Feb 2023 22:26  Patient On (Oxygen Delivery Method): room air        I&O's Detail    22 Feb 2023 07:01  -  23 Feb 2023 07:00  --------------------------------------------------------  IN:    Albumin 5%  - 250 mL: 750 mL    Insulin: 38 mL    IV PiggyBack: 100 mL    IV PiggyBack: 250 mL    Lactated Ringers Bolus: 500 mL    Milrinone: 24 mL    Oral Fluid: 450 mL    PRBCs (Packed Red Blood Cells): 327 mL    sodium chloride 0.9%: 240 mL    sodium chloride 0.9%: 120 mL  Total IN: 2799 mL    OUT:    Chest Tube (mL): 720 mL    Chest Tube (mL): 160 mL    Chest Tube (mL): 240 mL    Chest Tube (mL): 65 mL    Indwelling Catheter - Urethral (mL): 945 mL    NiCARdipine: 0 mL    Norepinephrine: 0 mL  Total OUT: 2130 mL    Total NET: 669 mL      23 Feb 2023 07:01  -  24 Feb 2023 01:52  --------------------------------------------------------  IN:    Oral Fluid: 240 mL    sodium chloride 0.9%: 10 mL    sodium chloride 0.9%: 20 mL  Total IN: 270 mL    OUT:    Chest Tube (mL): 200 mL    Chest Tube (mL): 190 mL    Chest Tube (mL): 10 mL    Indwelling Catheter - Urethral (mL): 60 mL    Insulin: 0 mL    Voided (mL): 650 mL  Total OUT: 1110 mL    Total NET: -840 mL      LABS                        9.9    13.14 )-----------( 140      ( 23 Feb 2023 02:12 )             29.5     02-23    140  |  107  |  21.4<H>  ----------------------------<  114<H>  4.2   |  23.0  |  0.79    Ca    8.1<L>      23 Feb 2023 02:12  Mg     2.4     02-23    TPro  5.7<L>  /  Alb  3.9  /  TBili  0.5  /  DBili  x   /  AST  42<H>  /  ALT  13  /  AlkPhos  53  02-22    LIVER FUNCTIONS - ( 22 Feb 2023 02:10 )  Alb: 3.9 g/dL / Pro: 5.7 g/dL / ALK PHOS: 53 U/L / ALT: 13 U/L / AST: 42 U/L / GGT: x           PT/INR - ( 22 Feb 2023 02:10 )   PT: 13.6 sec;   INR: 1.17 ratio         PTT - ( 22 Feb 2023 02:10 )  PTT:26.8 sec        POCT Blood Glucose.: 212 mg/dL (02-23-23 @ 22:31)  POCT Blood Glucose.: 203 mg/dL (02-23-23 @ 21:09)  POCT Blood Glucose.: 210 mg/dL (02-23-23 @ 17:16)  POCT Blood Glucose.: 219 mg/dL (02-23-23 @ 11:23)  POCT Blood Glucose.: 147 mg/dL (02-23-23 @ 08:11)  POCT Blood Glucose.: 135 mg/dL (02-23-23 @ 04:14)  POCT Blood Glucose.: 118 mg/dL (02-23-23 @ 03:49)  POCT Blood Glucose.: 119 mg/dL (02-23-23 @ 01:57)      MEDICATIONS  (STANDING):  aspirin enteric coated 81 milliGRAM(s) Oral daily  atorvastatin 40 milliGRAM(s) Oral at bedtime  dextrose 5%. 1000 milliLiter(s) (100 mL/Hr) IV Continuous <Continuous>  dextrose 5%. 1000 milliLiter(s) (50 mL/Hr) IV Continuous <Continuous>  dextrose 50% Injectable 25 Gram(s) IV Push once  dextrose 50% Injectable 12.5 Gram(s) IV Push once  dextrose 50% Injectable 25 Gram(s) IV Push once  dextrose 50% Injectable 50 milliLiter(s) IV Push every 15 minutes  dextrose 50% Injectable 25 milliLiter(s) IV Push every 15 minutes  dextrose 50% Injectable 50 milliLiter(s) IV Push every 15 minutes  dextrose 50% Injectable 25 milliLiter(s) IV Push every 15 minutes  DULoxetine 60 milliGRAM(s) Oral daily  enoxaparin Injectable 40 milliGRAM(s) SubCutaneous every 24 hours  glucagon  Injectable 1 milliGRAM(s) IntraMuscular once  insulin glargine Injectable (LANTUS) 16 Unit(s) SubCutaneous at bedtime  insulin lispro (ADMELOG) corrective regimen sliding scale   SubCutaneous Before meals and at bedtime  insulin lispro Injectable (ADMELOG) 2 Unit(s) SubCutaneous three times a day before meals  lidocaine   4% Patch 1 Patch Transdermal daily  metoprolol tartrate 25 milliGRAM(s) Oral two times a day  pantoprazole    Tablet 40 milliGRAM(s) Oral daily  polyethylene glycol 3350 17 Gram(s) Oral daily  senna 2 Tablet(s) Oral at bedtime  sodium chloride 0.9% lock flush 3 milliLiter(s) IV Push every 8 hours  torsemide 20 milliGRAM(s) Oral daily    MEDICATIONS  (PRN):  acetaminophen     Tablet .. 650 milliGRAM(s) Oral every 6 hours PRN Mild Pain (1 - 3)  dextrose Oral Gel 15 Gram(s) Oral once PRN Blood Glucose LESS THAN 70 milliGRAM(s)/deciliter  ondansetron Injectable 4 milliGRAM(s) IV Push every 4 hours PRN Nausea and/or Vomiting  oxyCODONE    IR 10 milliGRAM(s) Oral every 4 hours PRN Severe Pain (7 - 10)  oxyCODONE    IR 5 milliGRAM(s) Oral every 4 hours PRN Moderate Pain (4 - 6)      Allergies:  No Known Allergies      Physical Exam:   Constitutional: NAD  Neck: supple, trachea midline.  No JVD  Respiratory: Breath Sounds diminished b/l lower fields to auscultation, no accessory muscle use noted. No wheezing, rales, or rhonchi noted b/l  Cardiovascular: Regular rate, regular rhythm, normal S1, S2; no murmurs or rub  Gastrointestinal: Soft, non-tender, non distended, normal bowel sounds  Extremities: GUTIERREZ x 4, trace LE peripheral edema, no cyanosis, no clubbing   Vascular: Equal and normal pulses: 2+ peripheral pulses throughout  Neurological: A+O x 3; speech clear and intact; no gross sensory/motor deficits  Skin: warm, dry, well perfused, no rashes  Tubes/Lines: Mediastinal & Lt/Rt pleural CT-No obvious air leak noted.   Incision: Sternal stable-No audible click,  incision C/D/I without obvious bleeding/discharge. EVH site C/D/I without obvious bleeding/discharge.       Code Status: Full Code     
ERNESTINA BRAGG  MRN-[id number]    HPI:  78 yo male with PMHs of HTN,  HLD,  DM presented to ED with c/o L arm numbness, abnormal movement since this morning. In ED CODE Stroke called. CT head neg for acute infarct.   Labs significant for elevated troponin 1.5 and abnormal EKG. Pt reported c/o chest pressure on exertion for the past couple of weeks.   He denies any headache, fever, dizziness, SOB, palpitations, nausea, vomiting, abdominal pain, change in urinary or bowel habits.   During my encounter, pt had no chest pain and was on his way to cath lab for cardiac cauterization    (20 Feb 2023 16:30)      Surgery/Hospital Course:  ---Cardiac Catheterization : 02.20.23     1.    Arterial Access - Right Radial   2.    Diagnostic Coronary Angiography   3.    Left Heart Cath   4.    Ultrasound Guided Access   5.    Arterial Access - Right Femoral   6.    IABP     Today:  No acute events     --- Cardiac Catheterization :  - Severe LM and 4 vessel CAD   - Chronically occluded distal RCA   - Severe LV systolic dysfunction with RWMA   - Significant hypotension after contrast injection of the LMCA   - Successful IABP insertion via the Rt CFA     ---- CT Angio Neck Stroke Protocol w/ IV Cont : 02.20.23   -CT PERFUSION demonstrated: No core infarct. No active ischemia.  If symptoms persist consider follow up head CT or MRI, MRA  if no   contraindication.  -CTA COW:  Mild focal stenosis in the left P2 segment of left PCA.   Bilateral persistent fetal origin of PCAs. Hypoplastic P1 segments.  -CTA NECK: Less than 50% stenosis at the left ICA origin by NASCET   criteria.  Bilateral vertebral arteries are patent without flow limiting stenosis.      ICU Vital Signs Last 24 Hrs  T(C): 37.3 (20 Feb 2023 17:00), Max: 37.3 (20 Feb 2023 17:00)  T(F): 99.1 (20 Feb 2023 17:00), Max: 99.1 (20 Feb 2023 17:00)  HR: 63 (20 Feb 2023 17:15) (63 - 127)  BP: 136/91 (20 Feb 2023 17:15) (115/69 - 136/91)  BP(mean): 109 (20 Feb 2023 17:15) (109 - 109)  ABP: --  ABP(mean): --  RR: 18 (20 Feb 2023 17:15) (16 - 20)  SpO2: 97% (20 Feb 2023 17:15) (97% - 98%)    O2 Parameters below as of 20 Feb 2023 15:27  Patient On (Oxygen Delivery Method): room air            Physical Exam:  Gen: A&O   CNS: non focal 	  Neck: no JVD  RES : clear , no wheezing              CVS: Regular  rhythm. Normal S1/S2  Abd: Soft, non-distended. Bowel sounds present.  Skin: No rash.  Ext:  no edema , IABP    ============================I/O===========================   I&O's Detail    ============================ LABS =========================                        14.1   10.27 )-----------( 166      ( 20 Feb 2023 12:27 )             42.7     02-20    136  |  98  |  21.4<H>  ----------------------------<  246<H>  4.3   |  25.0  |  1.08    Ca    9.6      20 Feb 2023 12:27    TPro  6.8  /  Alb  4.1  /  TBili  0.3<L>  /  DBili  x   /  AST  56<H>  /  ALT  24  /  AlkPhos  77  02-20    LIVER FUNCTIONS - ( 20 Feb 2023 12:27 )  Alb: 4.1 g/dL / Pro: 6.8 g/dL / ALK PHOS: 77 U/L / ALT: 24 U/L / AST: 56 U/L / GGT: x           PT/INR - ( 20 Feb 2023 12:27 )   PT: 11.8 sec;   INR: 1.02 ratio         PTT - ( 20 Feb 2023 12:27 )  PTT:29.2 sec      ======================Micro/Rad/Cardio=================  Culture: Reviewed   CXR: Reviewed  Echo:Reviewed  ======================================================  PAST MEDICAL & SURGICAL HISTORY:  HTN (hypertension)      HLD (hyperlipidemia)      Post traumatic stress disorder (PTSD)        ====================ASSESSMENT ==============  78 yo male with PMHs of HTN,  HLD,  DM presented to ED with c/o L arm numbness, abnormal movement since this morning. In ED CODE Stroke called. CT head neg for acute infarct.   Labs significant for elevated troponin 1.5 and abnormal EKG. Pt reported c/o chest pressure on exertion for the past couple of weeks.   He denies any headache, fever, dizziness, SOB, palpitations, nausea, vomiting, abdominal pain, change in urinary or bowel habits.   During my encounter, pt had no chest pain and was on his way to cath lab for cardiac cauterization    (20 Feb 2023 16:30)    HTN (hypertension)  CAD  NSTEMI  HLD (hyperlipidemia)  Post traumatic stress disorder (PTSD)  ---S/p Cardiac Catheterization : 02.20.23   ----S/p  CT Angio Neck Stroke Protocol w/ IV Cont : 02.20.23     Plan:  - Heparin drip (full AC until LV thrombus excluded)   - IABP on 1:1   - Aggressive medical management and risk factor modification   - ASA and statin therapy   - Preop work up , carotids US      ====================== NEUROLOGY=====================  intact  ==================== RESPIRATORY======================  RA    ====================CARDIOVASCULAR==================  metoprolol tartrate 25 milliGRAM(s) Oral every 12 hours    ===================HEMATOLOGIC/ONC ===================  Monitor H&H/Plts    heparin   Injectable 4700 Unit(s) IV Push every 6 hours PRN For aPTT less than 40  heparin  Infusion.  Unit(s)/Hr (9.5 mL/Hr) IV Continuous <Continuous>    ===================== RENAL =========================  Continue monitoring urine output, I&OS, BUN/Cr     ==================== GASTROINTESTINAL===================  dextrose 5%. 1000 milliLiter(s) (100 mL/Hr) IV Continuous <Continuous>  dextrose 5%. 1000 milliLiter(s) (50 mL/Hr) IV Continuous <Continuous>  pantoprazole    Tablet 40 milliGRAM(s) Oral before breakfast  sodium chloride 0.9% lock flush 3 milliLiter(s) IV Push every 8 hours    =======================    ENDOCRINE  =====================  atorvastatin 40 milliGRAM(s) Oral at bedtime  dextrose 50% Injectable 25 Gram(s) IV Push once  dextrose 50% Injectable 12.5 Gram(s) IV Push once  dextrose 50% Injectable 25 Gram(s) IV Push once  dextrose Oral Gel 15 Gram(s) Oral once PRN Blood Glucose LESS THAN 70 milliGRAM(s)/deciliter  glucagon  Injectable 1 milliGRAM(s) IntraMuscular once  insulin lispro (ADMELOG) corrective regimen sliding scale   SubCutaneous three times a day before meals    ========================INFECTIOUS DISEASE================      -Monitor Neurologic status ,   -Head of the bed should remain elevated to 45 degrees,  -Monitor for arrhythmias and monitor parameters for organ perfusion,  -Glycemic control is satisfactory,  -Nutritional goals will be met using po eventually , insure adequate caloric intake and monitor the same ,  -Electrolytes have been repleted as necessary , pain control has been achieved  and wound care has been carried out ,  -Stress ulcer and VTE prophylaxis will be achieved,    I have spent 35 minutes providing acute care for this critically ill patient     Patient requires continuous monitoring with bedside rhythm monitoring, pulse ox monitoring, and intermittent blood gas analysis. Care plan discussed with ICU care team. Patient remained critical and at risk for life threatening decompensation.           
INTERVAL EVENTS:  Follow up diabetes management    ROS: Patient denies chest pain, SOB, abd pain, N/V.    MEDICATIONS  (STANDING):  aspirin enteric coated 81 milliGRAM(s) Oral daily  atorvastatin 40 milliGRAM(s) Oral at bedtime  dextrose 5%. 1000 milliLiter(s) (100 mL/Hr) IV Continuous <Continuous>  dextrose 5%. 1000 milliLiter(s) (50 mL/Hr) IV Continuous <Continuous>  dextrose 50% Injectable 50 milliLiter(s) IV Push every 15 minutes  dextrose 50% Injectable 25 milliLiter(s) IV Push every 15 minutes  dextrose 50% Injectable 50 milliLiter(s) IV Push every 15 minutes  dextrose 50% Injectable 25 milliLiter(s) IV Push every 15 minutes  dextrose 50% Injectable 25 Gram(s) IV Push once  dextrose 50% Injectable 12.5 Gram(s) IV Push once  dextrose 50% Injectable 25 Gram(s) IV Push once  DULoxetine 60 milliGRAM(s) Oral daily  enoxaparin Injectable 40 milliGRAM(s) SubCutaneous every 24 hours  glucagon  Injectable 1 milliGRAM(s) IntraMuscular once  insulin glargine Injectable (LANTUS) 16 Unit(s) SubCutaneous at bedtime  insulin lispro (ADMELOG) corrective regimen sliding scale   SubCutaneous Before meals and at bedtime  insulin lispro Injectable (ADMELOG) 2 Unit(s) SubCutaneous three times a day before meals  lidocaine   4% Patch 1 Patch Transdermal daily  metoprolol tartrate 25 milliGRAM(s) Oral two times a day  pantoprazole    Tablet 40 milliGRAM(s) Oral daily  polyethylene glycol 3350 17 Gram(s) Oral daily  senna 2 Tablet(s) Oral at bedtime  sodium chloride 0.9% lock flush 3 milliLiter(s) IV Push every 8 hours  torsemide 20 milliGRAM(s) Oral daily    MEDICATIONS  (PRN):  acetaminophen     Tablet .. 650 milliGRAM(s) Oral every 6 hours PRN Mild Pain (1 - 3)  dextrose Oral Gel 15 Gram(s) Oral once PRN Blood Glucose LESS THAN 70 milliGRAM(s)/deciliter  ondansetron Injectable 4 milliGRAM(s) IV Push every 4 hours PRN Nausea and/or Vomiting  oxyCODONE    IR 10 milliGRAM(s) Oral every 4 hours PRN Severe Pain (7 - 10)  oxyCODONE    IR 5 milliGRAM(s) Oral every 4 hours PRN Moderate Pain (4 - 6)    Allergies  No Known Allergies    Vital Signs Last 24 Hrs  T(C): 37.1 (24 Feb 2023 07:47), Max: 37.2 (23 Feb 2023 16:19)  T(F): 98.7 (24 Feb 2023 07:47), Max: 99 (23 Feb 2023 16:19)  HR: 76 (24 Feb 2023 12:15) (72 - 88)  BP: 122/72 (24 Feb 2023 12:15) (122/72 - 155/84)  BP(mean): --  RR: 18 (24 Feb 2023 12:15) (18 - 18)  SpO2: 96% (24 Feb 2023 12:15) (94% - 99%)    Parameters below as of 24 Feb 2023 12:15  Patient On (Oxygen Delivery Method): room air      PHYSICAL EXAM:  General: No apparent distress  Neck: Supple, trachea midline, no thyromegaly  Respiratory: Lungs clear bilaterally, normal rate, effort  Cardiac: +S1, S2, no m/r/g  GI: +BS, soft, non tender, non distended  Extremities: No peripheral edema, no pedal lesions  Neuro: A+O X3, no tremor  Pysch: Affect appropriate   Skin: No acanthosis       LABS:                        10.6   12.05 )-----------( 135      ( 24 Feb 2023 05:34 )             32.0     02-24    139  |  103  |  28.0<H>  ----------------------------<  157<H>  3.4<L>   |  25.0  |  0.79    Ca    8.7      24 Feb 2023 05:34  Mg     2.0     02-24      POCT Blood Glucose.: 237 mg/dL (02-24-23 @ 12:03)  POCT Blood Glucose.: 164 mg/dL (02-24-23 @ 08:37)  POCT Blood Glucose.: 212 mg/dL (02-23-23 @ 22:31)  POCT Blood Glucose.: 203 mg/dL (02-23-23 @ 21:09)  POCT Blood Glucose.: 210 mg/dL (02-23-23 @ 17:16)        Triiodothyronine, Total (T3 Total): 97 ng/dL (02-21-23 @ 02:30)  Thyroid Stimulating Hormone, Serum: 1.30 uIU/mL (02-21-23 @ 02:30)  Thyroid Stimulating Hormone, Serum: 1.45 uIU/mL (02-20-23 @ 18:19)  
                            [facility]                                        Ne[facility] at [place]                                 Talib George, & Akhil                                  [address]                                        [place], [place], [address]                                             [phone]        CC: Radial nerve palsy.    HPI:   The patient is a 77y Male who presents with chest pain and was found to have elevated troponin.   He also noted left hand and arm weakness around 8 pm the night prior to arrival. This has persisted. There is mild numbness as well. He reports that he has difficulty lifting fingers and opening hand.   He has prior war injury to left hand with loss of D4.    Interim history:  Now in CICU status post CABG.     ROS:   Denies headache or dizziness.  Denies chest pain.  Denies shortness of breath.    MEDICATIONS  (STANDING):  acetaminophen     Tablet .. 975 milliGRAM(s) Oral every 6 hours  aspirin enteric coated 81 milliGRAM(s) Oral daily  atorvastatin 80 milliGRAM(s) Oral at bedtime  cefuroxime  IVPB 1500 milliGRAM(s) IV Intermittent every 8 hours  chlorhexidine 2% Cloths 1 Application(s) Topical two times a day  DULoxetine 60 milliGRAM(s) Oral daily  enoxaparin Injectable 40 milliGRAM(s) SubCutaneous every 24 hours  insulin lispro Injectable (ADMELOG) 2 Unit(s) SubCutaneous three times a day before meals  insulin regular Infusion 4 Unit(s)/Hr (4 mL/Hr) IV Continuous <Continuous>  lidocaine   4% Patch 1 Patch Transdermal daily  milrinone Infusion 0.2 MICROgram(s)/kG/Min (4.81 mL/Hr) IV Continuous <Continuous>  pantoprazole    Tablet 40 milliGRAM(s) Oral daily  polyethylene glycol 3350 17 Gram(s) Oral daily  senna 2 Tablet(s) Oral at bedtime  sodium chloride 0.9%. 1000 milliLiter(s) (10 mL/Hr) IV Continuous <Continuous>  sodium chloride 0.9%. 1000 milliLiter(s) (5 mL/Hr) IV Continuous <Continuous>  vancomycin  IVPB 1250 milliGRAM(s) IV Intermittent every 12 hours    Vital Signs Last 24 Hrs  T(C): 37.2 (22 Feb 2023 08:00), Max: 37.8 (22 Feb 2023 00:00)  T(F): 99 (22 Feb 2023 08:00), Max: 100 (22 Feb 2023 00:00)  HR: 89 (22 Feb 2023 11:00) (62 - 90)  RR: 20 (22 Feb 2023 11:00) (10 - 20)  SpO2: 92% (22 Feb 2023 11:00) (92% - 100%)    Parameters below as of 22 Feb 2023 11:00  Patient On (Oxygen Delivery Method): room air    Detailed Neurologic Exam:    Mental status: The patient is awake and alert. There is no aphasia. There is no dysarthria.     Cranial nerves: Pupils equal and react symmetrically to light. There is no visual field deficit to threat. Extraocular motion is full with no nystagmus. Facial sensation is intact. Facial musculature is symmetric. Palate elevates symmetrically. Tongue is midline.    Motor: There is normal bulk and tone.  There is no tremor.  Strength is 5/5 in the right arm and leg and in the left leg.   Strength is 5/5 in the left deltoid and bicep, wrist flexion, and finger flexion. There is 4/5 strength in triceps, wrist extension, and finger extension. The left hand is status post injury with loss of D4.    Sensation: Grossly intact to light touch and pin.    Reflexes: 1+ throughout and plantar responses are flexor.    Cerebellar: No dysmetria on finger nose testing.    Labs:     02-22    140  |  105  |  15.3  ----------------------------<  112<H>  3.9   |  23.0  |  0.81    Ca    8.6      22 Feb 2023 02:10  Mg     1.9     02-22    TPro  5.7<L>  /  Alb  3.9  /  TBili  0.5  /  DBili  x   /  AST  42<H>  /  ALT  13  /  AlkPhos  53  02-22                            8.5    10.72 )-----------( 151      ( 22 Feb 2023 02:10 )             25.2         
                            [facility]                                        Neurology at [place]                                 Talib George, & Akhil                                  [address]                                        [place], [place], [address]                                             [phone]        CC: Radial nerve palsy.    HPI:   The patient is a 77y Male who presents with chest pain and was found to have elevated troponin.   He also noted left hand and arm weakness around 8 pm the night prior to arrival. This has persisted. There is mild numbness as well. He reports that he has difficulty lifting fingers and opening hand.   He has prior war injury to left hand with loss of D4.    Interim history:  Remains in CICU status post CABG.     ROS:   Denies headache or dizziness.  Denies chest pain.  Denies shortness of breath.    MEDICATIONS  (STANDING):  aspirin enteric coated 81 milliGRAM(s) Oral daily  atorvastatin 40 milliGRAM(s) Oral at bedtime  chlorhexidine 2% Cloths 1 Application(s) Topical two times a day  dextrose 5%. 1000 milliLiter(s) (50 mL/Hr) IV Continuous <Continuous>  dextrose 5%. 1000 milliLiter(s) (100 mL/Hr) IV Continuous <Continuous>  dextrose 50% Injectable 25 Gram(s) IV Push once  dextrose 50% Injectable 12.5 Gram(s) IV Push once  DULoxetine 60 milliGRAM(s) Oral daily  enoxaparin Injectable 40 milliGRAM(s) SubCutaneous every 24 hours  glucagon  Injectable 1 milliGRAM(s) IntraMuscular once  insulin glargine Injectable (LANTUS) 16 Unit(s) SubCutaneous at bedtime  insulin lispro (ADMELOG) corrective regimen sliding scale   SubCutaneous three times a day before meals  insulin lispro Injectable (ADMELOG) 2 Unit(s) SubCutaneous three times a day before meals  lidocaine   4% Patch 1 Patch Transdermal daily  metoprolol tartrate 25 milliGRAM(s) Oral two times a day  pantoprazole    Tablet 40 milliGRAM(s) Oral daily  polyethylene glycol 3350 17 Gram(s) Oral daily  senna 2 Tablet(s) Oral at bedtime  sodium chloride 0.9% lock flush 3 milliLiter(s) IV Push every 8 hours  torsemide 20 milliGRAM(s) Oral daily    Vital Signs Last 24 Hrs  T(C): 37 (23 Feb 2023 08:00), Max: 37.8 (22 Feb 2023 16:00)  T(F): 98.6 (23 Feb 2023 08:00), Max: 100 (22 Feb 2023 16:00)  HR: 75 (23 Feb 2023 10:00) (66 - 93)  BP: 133/61 (23 Feb 2023 10:00) (109/62 - 156/69)  BP(mean): 88 (23 Feb 2023 10:00) (81 - 102)  RR: 19 (23 Feb 2023 10:00) (17 - 22)  SpO2: 94% (23 Feb 2023 10:00) (91% - 99%)    Parameters below as of 23 Feb 2023 08:00  Patient On (Oxygen Delivery Method): room air    Detailed Neurologic Exam:    Mental status: The patient is awake and alert. There is no aphasia. There is no dysarthria.     Cranial nerves: Pupils equal and react symmetrically to light. There is no visual field deficit to threat. Extraocular motion is full with no nystagmus. Facial sensation is intact. Facial musculature is symmetric. Palate elevates symmetrically. Tongue is midline.    Motor: There is normal bulk and tone.  There is no tremor.  Strength is 5/5 in the right arm and leg and in the left leg.   Strength is 5/5 in the left deltoid and bicep, wrist flexion, and finger flexion. There is 4/5 strength in triceps, wrist extension, and finger extension. The left hand is status post injury with loss of D4.    Sensation: Grossly intact to light touch and pin.    Reflexes: 1+ throughout and plantar responses are flexor.    Cerebellar: No dysmetria on finger nose testing.    Labs:     02-23    140  |  107  |  21.4<H>  ----------------------------<  114<H>  4.2   |  23.0  |  0.79    Ca    8.1<L>      23 Feb 2023 02:12  Mg     2.4     02-23    TPro  5.7<L>  /  Alb  3.9  /  TBili  0.5  /  DBili  x   /  AST  42<H>  /  ALT  13  /  AlkPhos  53  02-22                            9.9    13.14 )-----------( 140      ( 23 Feb 2023 02:12 )             29.5             
Brief summary:  77y Male POD# 1 C3L with Gonclaves       SUBJECTIVE:  Pt alert and oriented, pt recently extubated, Pt c/o of incisional pain, pt denies palpitations, SOB, nausea, and vomiting    Overnight events:  none       PAST MEDICAL & SURGICAL HISTORY:  HTN (hypertension)      HLD (hyperlipidemia)      Post traumatic stress disorder (PTSD)          MEDICATIONS  albumin human  5% IVPB 250 milliLiter(s) IV Intermittent every 30 minutes PRN  aspirin enteric coated 81 milliGRAM(s) Oral daily  atorvastatin 80 milliGRAM(s) Oral at bedtime  cefuroxime  IVPB 1500 milliGRAM(s) IV Intermittent every 8 hours  chlorhexidine 0.12% Liquid 15 milliLiter(s) Oral Mucosa every 12 hours  chlorhexidine 2% Cloths 1 Application(s) Topical two times a day  dextrose 50% Injectable 50 milliLiter(s) IV Push every 15 minutes  dextrose 50% Injectable 25 milliLiter(s) IV Push every 15 minutes  dextrose 50% Injectable 50 milliLiter(s) IV Push every 15 minutes  dextrose 50% Injectable 25 milliLiter(s) IV Push every 15 minutes  DULoxetine 60 milliGRAM(s) Oral daily  insulin regular Infusion 4 Unit(s)/Hr IV Continuous <Continuous>  magnesium sulfate  IVPB 2 Gram(s) IV Intermittent once  milrinone Infusion 0.2 MICROgram(s)/kG/Min IV Continuous <Continuous>  niCARdipine Infusion 5 mG/Hr IV Continuous <Continuous>  norepinephrine Infusion 0.02 MICROgram(s)/kG/Min IV Continuous <Continuous>  ondansetron Injectable 4 milliGRAM(s) IV Push every 4 hours PRN  pantoprazole    Tablet 40 milliGRAM(s) Oral daily  polyethylene glycol 3350 17 Gram(s) Oral daily  potassium chloride  10 mEq/50 mL IVPB 10 milliEquivalent(s) IV Intermittent every 1 hour  potassium chloride  10 mEq/50 mL IVPB 10 milliEquivalent(s) IV Intermittent every 1 hour  potassium chloride  10 mEq/50 mL IVPB 10 milliEquivalent(s) IV Intermittent every 1 hour  senna 2 Tablet(s) Oral at bedtime  sodium chloride 0.9%. 1000 milliLiter(s) IV Continuous <Continuous>  sodium chloride 0.9%. 1000 milliLiter(s) IV Continuous <Continuous>  vancomycin  IVPB 1250 milliGRAM(s) IV Intermittent every 12 hours  MEDICATIONS  (PRN):  albumin human  5% IVPB 250 milliLiter(s) IV Intermittent every 30 minutes PRN hypovolemia  ondansetron Injectable 4 milliGRAM(s) IV Push every 4 hours PRN Nausea and/or Vomiting    Height (cm): 188 ( @ 05:52)  Weight (kg): 80.2 ( @ 05:52)  BMI (kg/m2): 22.7 ( @ 05:52)  BSA (m2): 2.06 ( @ 05:52)Mode: CPAP with PS, FiO2: 30, PEEP: 5, PS: 5, MAP: 9  Daily     Daily Weight in k.4 (2023 04:00)      ABG - ( 2023 00:15 )  pH, Arterial: 7.460 pH, Blood: x     /  pCO2: 43    /  pO2: 86    / HCO3: 31    / Base Excess: 6.8   /  SaO2: 99.7                                    8.5    10.72 )-----------( 151      ( 2023 02:10 )             25.2       140  |  105  |  15.3  ----------------------------<  112<H>  3.9   |  23.0  |  0.81    Ca    8.6      2023 02:10  Mg     1.9         TPro  5.7<L>  /  Alb  3.9  /  TBili  0.5  /  DBili  x   /  AST  42<H>  /  ALT  13  /  AlkPhos  53  22    CARDIAC MARKERS ( 2023 02:10 )  x     / 1.40 ng/mL / 343 U/L / x     / 11.0 ng/mL  CARDIAC MARKERS ( 2023 14:02 )  x     / 3.04 ng/mL / 226 U/L / x     / 16.8 ng/mL  CARDIAC MARKERS ( 2023 18:19 )  x     / 1.66 ng/mL / 149 U/L / x     / x      CARDIAC MARKERS ( 2023 16:10 )  x     / 1.39 ng/mL / 152 U/L / x     / 10.7 ng/mL  CARDIAC MARKERS ( 2023 12:27 )  x     / 1.50 ng/mL / x     / x     / x        PT/INR - ( 2023 02:10 )   PT: 13.6 sec;   INR: 1.17 ratio         PTT - ( 2023 02:10 )  PTT:26.8 sec      Objective:  T(C): 37.8 (23 @ 00:00), Max: 37.8 (23 @ 00:00)  HR: 81 (23 @ 03:00) (55 - 90)  BP: 128/86 (23 @ 08:00) (112/70 - 160/70)  RR: 10 (23 @ 18:00) (9 - 24)  SpO2: 100% (23 @ 03:00) (90% - 100%)  Wt(kg): --CAPILLARY BLOOD GLUCOSE      POCT Blood Glucose.: 118 mg/dL (2023 03:11)  POCT Blood Glucose.: 116 mg/dL (2023 02:13)  POCT Blood Glucose.: 117 mg/dL (2023 01:00)  POCT Blood Glucose.: 125 mg/dL (2023 23:15)  POCT Blood Glucose.: 138 mg/dL (2023 21:00)  POCT Blood Glucose.: 145 mg/dL (2023 18:43)  POCT Blood Glucose.: 127 mg/dL (2023 17:03)  POCT Blood Glucose.: 123 mg/dL (2023 15:53)  POCT Blood Glucose.: 120 mg/dL (2023 15:19)  POCT Blood Glucose.: 153 mg/dL (2023 07:40)  I&O's Summary    2023 07:01  -  2023 07:00  --------------------------------------------------------  IN: 104 mL / OUT: 1690 mL / NET: -1586 mL    2023 07:01  -  2023 03:59  --------------------------------------------------------  IN: 3489.2 mL / OUT: 3575 mL / NET: -85.8 mL        Physical Exam  Neuro: A+O x 3, non-focal, speech clear and intact  HEENT:  NCAT, PERRL, EOMI. No conjuctival edema or icterus, no thrush.  Neck: supple, trachea midline  Pulm: CTA, good air entry, equal bilaterally, no rales/rhonchi/wheezing, no accessory muscle use noted  CV: regular rate, regular rhythm, +S1S2, no murmur or rub noted  Abd: soft, NT, ND, + BS  Ext: GTUIERREZ x 4, no edema, no cyanosis or clubbing, R groin IABP, 2+ DP b/l, distal motor/neuro/circ intact.  Inc: MSI C/D/I/stable w/ dressing, RVH C/D/I with Ace wrap  Chest tubes: Left, RIght SS, PB CT   PW 50/10/0.8 AV wires   Lines: RIJ SWAN, RRAL, R fem IABP     Imaging:  CXR:  < from: Xray Chest 1 View-PORTABLE IMMEDIATE (23 @ 13:42) >  Portable chest 1:14 PM    COMPARISON: 2023    FINDINGS:  Heart/Vascular: The heart size, mediastinum, hilum and aorta are within   normal limits for projection. Status post median sternotomy and CABG.  Pulmonary: Midline trachea. There is no gross pneumothorax or pleural   effusion bilaterally. There are mild congestive changes. Mild   subsegmental atelectasis right perihilar.  Bones: There is no fracture.  Lines and catheter: ET tube mid trachea. Tip of NG tube at GE junction   with side-port in distal esophagus. Bilateral chest tubes. 2 mediastinal   drains. Tip of right IJ approach [place]-Ayaz catheter in main pulmonary   artery.    Impression:    Status post CABG with mild pulmonary venous congestion and mild right   perihilar atelectasis.    Support lines and catheters as above.    --- End of Report ---            MOJGAN BYRNE DO; Attending Radiologist  This document has been electronically signed. 2023  3:35PM    < end of copied text >                    
INTERVAL HPI/OVERNIGHT EVENTS: follow up of diabetes    MEDICATIONS  (STANDING):  aspirin enteric coated 81 milliGRAM(s) Oral daily  atorvastatin 40 milliGRAM(s) Oral at bedtime  dextrose 5%. 1000 milliLiter(s) (100 mL/Hr) IV Continuous <Continuous>  dextrose 5%. 1000 milliLiter(s) (50 mL/Hr) IV Continuous <Continuous>  dextrose 50% Injectable 50 milliLiter(s) IV Push every 15 minutes  dextrose 50% Injectable 25 milliLiter(s) IV Push every 15 minutes  dextrose 50% Injectable 50 milliLiter(s) IV Push every 15 minutes  dextrose 50% Injectable 25 milliLiter(s) IV Push every 15 minutes  dextrose 50% Injectable 25 Gram(s) IV Push once  dextrose 50% Injectable 12.5 Gram(s) IV Push once  dextrose 50% Injectable 25 Gram(s) IV Push once  DULoxetine 60 milliGRAM(s) Oral daily  enoxaparin Injectable 40 milliGRAM(s) SubCutaneous every 24 hours  glucagon  Injectable 1 milliGRAM(s) IntraMuscular once  insulin glargine Injectable (LANTUS) 16 Unit(s) SubCutaneous at bedtime  insulin lispro (ADMELOG) corrective regimen sliding scale   SubCutaneous Before meals and at bedtime  lidocaine   4% Patch 1 Patch Transdermal daily  linagliptin 5 milliGRAM(s) Oral daily  metFORMIN 1000 milliGRAM(s) Oral two times a day  metoprolol tartrate 25 milliGRAM(s) Oral two times a day  pantoprazole    Tablet 40 milliGRAM(s) Oral daily  sodium chloride 0.9% lock flush 3 milliLiter(s) IV Push every 8 hours    MEDICATIONS  (PRN):  acetaminophen     Tablet .. 650 milliGRAM(s) Oral every 6 hours PRN Mild Pain (1 - 3)  dextrose Oral Gel 15 Gram(s) Oral once PRN Blood Glucose LESS THAN 70 milliGRAM(s)/deciliter  ondansetron Injectable 4 milliGRAM(s) IV Push every 4 hours PRN Nausea and/or Vomiting  oxyCODONE    IR 10 milliGRAM(s) Oral every 4 hours PRN Severe Pain (7 - 10)  oxyCODONE    IR 5 milliGRAM(s) Oral every 4 hours PRN Moderate Pain (4 - 6)      Allergies    No Known Allergies    Intolerances        Review of systems:    Vital Signs Last 24 Hrs  T(C): 37.1 (26 Feb 2023 16:37), Max: 37.1 (26 Feb 2023 05:21)  T(F): 98.7 (26 Feb 2023 16:37), Max: 98.7 (26 Feb 2023 05:21)  HR: 85 (26 Feb 2023 16:37) (68 - 85)  BP: 99/65 (26 Feb 2023 16:37) (93/61 - 133/85)  BP(mean): --  RR: 18 (26 Feb 2023 16:37) (18 - 18)  SpO2: 94% (26 Feb 2023 16:37) (93% - 100%)    Parameters below as of 26 Feb 2023 16:37  Patient On (Oxygen Delivery Method): room air            LABS:                        12.5   10.89 )-----------( 185      ( 26 Feb 2023 04:57 )             38.3     02-26    138  |  101  |  27.1<H>  ----------------------------<  116<H>  3.6   |  26.0  |  0.73    Ca    8.8      26 Feb 2023 04:57  Mg     2.0     02-26            POCT Blood Glucose.: 152 mg/dL (02-26-23 @ 16:57)  POCT Blood Glucose.: 168 mg/dL (02-26-23 @ 12:00)  POCT Blood Glucose.: 121 mg/dL (02-26-23 @ 08:11)  POCT Blood Glucose.: 156 mg/dL (02-25-23 @ 21:50)  POCT Blood Glucose.: 159 mg/dL (02-25-23 @ 21:13)  POCT Blood Glucose.: 144 mg/dL (02-25-23 @ 17:11)  POCT Blood Glucose.: 198 mg/dL (02-25-23 @ 12:21)  POCT Blood Glucose.: 133 mg/dL (02-25-23 @ 08:16)  POCT Blood Glucose.: 137 mg/dL (02-24-23 @ 21:13)  POCT Blood Glucose.: 143 mg/dL (02-24-23 @ 17:04)  POCT Blood Glucose.: 237 mg/dL (02-24-23 @ 12:03)  POCT Blood Glucose.: 164 mg/dL (02-24-23 @ 08:37)  POCT Blood Glucose.: 212 mg/dL (02-23-23 @ 22:31)  POCT Blood Glucose.: 203 mg/dL (02-23-23 @ 21:09)  POCT Blood Glucose.: 210 mg/dL (02-23-23 @ 17:16)        
Brief summary:  77yMale seen and assessed at bedside.  POD #5 s/p C3L.  Pt laying comfortably in hospital bed in NAD on room air.  States no current physical complaints at this time.  Denies f/c, n/v, chest pain, sob, abdominal pain, d/c, urinary symptoms.  ROS negative x 10.    Overnight events:  None.  Hospital course progressing as expected.        PAST MEDICAL & SURGICAL HISTORY:  HTN (hypertension)    HLD (hyperlipidemia)    Post traumatic stress disorder (PTSD)        Medications:  acetaminophen     Tablet .. 650 milliGRAM(s) Oral every 6 hours PRN  aspirin enteric coated 81 milliGRAM(s) Oral daily  atorvastatin 40 milliGRAM(s) Oral at bedtime  colchicine 0.6 milliGRAM(s) Oral two times a day  dextrose 5%. 1000 milliLiter(s) IV Continuous <Continuous>  dextrose 5%. 1000 milliLiter(s) IV Continuous <Continuous>  dextrose 50% Injectable 50 milliLiter(s) IV Push every 15 minutes  dextrose 50% Injectable 25 milliLiter(s) IV Push every 15 minutes  dextrose 50% Injectable 50 milliLiter(s) IV Push every 15 minutes  dextrose 50% Injectable 25 milliLiter(s) IV Push every 15 minutes  dextrose 50% Injectable 25 Gram(s) IV Push once  dextrose 50% Injectable 12.5 Gram(s) IV Push once  dextrose 50% Injectable 25 Gram(s) IV Push once  dextrose Oral Gel 15 Gram(s) Oral once PRN  DULoxetine 60 milliGRAM(s) Oral daily  enoxaparin Injectable 40 milliGRAM(s) SubCutaneous every 24 hours  glucagon  Injectable 1 milliGRAM(s) IntraMuscular once  insulin glargine Injectable (LANTUS) 16 Unit(s) SubCutaneous at bedtime  insulin lispro (ADMELOG) corrective regimen sliding scale   SubCutaneous Before meals and at bedtime  lidocaine   4% Patch 1 Patch Transdermal daily  linagliptin 5 milliGRAM(s) Oral daily  metFORMIN 1000 milliGRAM(s) Oral two times a day  metoprolol tartrate 25 milliGRAM(s) Oral two times a day  ondansetron Injectable 4 milliGRAM(s) IV Push every 4 hours PRN  oxyCODONE    IR 10 milliGRAM(s) Oral every 4 hours PRN  oxyCODONE    IR 5 milliGRAM(s) Oral every 4 hours PRN  pantoprazole    Tablet 40 milliGRAM(s) Oral daily  sodium chloride 0.9% lock flush 3 milliLiter(s) IV Push every 8 hours  torsemide 20 milliGRAM(s) Oral daily      MEDICATIONS  (PRN):  acetaminophen     Tablet .. 650 milliGRAM(s) Oral every 6 hours PRN Mild Pain (1 - 3)  dextrose Oral Gel 15 Gram(s) Oral once PRN Blood Glucose LESS THAN 70 milliGRAM(s)/deciliter  ondansetron Injectable 4 milliGRAM(s) IV Push every 4 hours PRN Nausea and/or Vomiting  oxyCODONE    IR 10 milliGRAM(s) Oral every 4 hours PRN Severe Pain (7 - 10)  oxyCODONE    IR 5 milliGRAM(s) Oral every 4 hours PRN Moderate Pain (4 - 6)        Daily Review:               11.4   9.77  )-----------( 160      ( 25 Feb 2023 05:20 )             34.5   02-25    140  |  101  |  25.0<H>  ----------------------------<  120<H>  4.3   |  28.0  |  0.79    Ca    9.2      25 Feb 2023 05:20  Mg     2.2     02-25      T(C): 36.8 (02-25-23 @ 21:24), Max: 36.9 (02-25-23 @ 04:58)  HR: 71 (02-26-23 @ 00:13) (66 - 83)  BP: 110/71 (02-26-23 @ 00:13) (107/68 - 119/79)  RR: 18 (02-26-23 @ 00:13) (17 - 18)  SpO2: 97% (02-26-23 @ 00:13) (97% - 100%)      CAPILLARY BLOOD GLUCOSE    POCT Blood Glucose.: 156 mg/dL (25 Feb 2023 21:50)  POCT Blood Glucose.: 159 mg/dL (25 Feb 2023 21:13)  POCT Blood Glucose.: 144 mg/dL (25 Feb 2023 17:11)  POCT Blood Glucose.: 198 mg/dL (25 Feb 2023 12:21)  POCT Blood Glucose.: 133 mg/dL (25 Feb 2023 08:16)      I&O's Summary    24 Feb 2023 07:01  -  25 Feb 2023 07:00  --------------------------------------------------------  IN: 1080 mL / OUT: 1540 mL / NET: -460 mL    25 Feb 2023 07:01  -  26 Feb 2023 01:04  --------------------------------------------------------  IN: 740 mL / OUT: 1960 mL / NET: -1220 mL        Physical Exam    Neuro: A+O x 3, non-focal, speech clear and intact  Pulm: coarse breath sounds bilaterally, no wheezing or rales  CV: RRR, +S1S2  Abd: soft, NT, ND, normoactive bowel sounds  Ext: +DP Pulses b/l, +PT pulses, no edema  Inc: Mediastinal sternal incision C/D/I/stable w/ dressing, right C/D/I  Chest tubes: left chest tube to WS, no air leak        CXR 2/25/23    < from: Xray Chest 1 View- PORTABLE-Routine (Xray Chest 1 View- PORTABLE-Routine in AM.) (02.25.23 @ 09:36) >    ACC: [id number] EXAM:  XR CHEST PORTABLE ROUTINE 1V   ORDERED BY: ANCELMO FARIAS     PROCEDURE DATE:  02/25/2023          INTERPRETATION:  History: Status post open heart surgery.    AP view of the chest was obtained.    Comparison: February 24, 2023.    Impression:    Left-sided chest tubes are in place. There is no evidence of   pneumothorax. There is linear atelectasis within the right midlung. The   lungs are otherwise clear. Heart size is normal. Patient is status post   sternotomy with multiple clips in the mediastinum and left axillary   region.    --- End of Report ---    < end of copied text >      
Brief summary:  77yMale seen and assessed at bedside.  Pt laying comfortably in hospital bed in NAD on room air.  States no current physical complaints at this time.  Denies f/c, n/v, chest pain, sob, abdominal pain, d/c, urinary symptoms.  ROS negative x 10.    Overnight events:  None.  Hospital course progressing as expected.        PAST MEDICAL & SURGICAL HISTORY:  HTN (hypertension)    HLD (hyperlipidemia)    Post traumatic stress disorder (PTSD)        Medications:  acetaminophen     Tablet .. 650 milliGRAM(s) Oral every 6 hours PRN  aspirin enteric coated 81 milliGRAM(s) Oral daily  atorvastatin 40 milliGRAM(s) Oral at bedtime  colchicine 0.6 milliGRAM(s) Oral two times a day  dextrose 5%. 1000 milliLiter(s) IV Continuous <Continuous>  dextrose 5%. 1000 milliLiter(s) IV Continuous <Continuous>  dextrose 50% Injectable 50 milliLiter(s) IV Push every 15 minutes  dextrose 50% Injectable 25 milliLiter(s) IV Push every 15 minutes  dextrose 50% Injectable 50 milliLiter(s) IV Push every 15 minutes  dextrose 50% Injectable 25 milliLiter(s) IV Push every 15 minutes  dextrose 50% Injectable 25 Gram(s) IV Push once  dextrose 50% Injectable 12.5 Gram(s) IV Push once  dextrose 50% Injectable 25 Gram(s) IV Push once  dextrose Oral Gel 15 Gram(s) Oral once PRN  DULoxetine 60 milliGRAM(s) Oral daily  enoxaparin Injectable 40 milliGRAM(s) SubCutaneous every 24 hours  glucagon  Injectable 1 milliGRAM(s) IntraMuscular once  insulin glargine Injectable (LANTUS) 16 Unit(s) SubCutaneous at bedtime  insulin lispro (ADMELOG) corrective regimen sliding scale   SubCutaneous Before meals and at bedtime  insulin lispro Injectable (ADMELOG) 2 Unit(s) SubCutaneous three times a day before meals  lidocaine   4% Patch 1 Patch Transdermal daily  metoprolol tartrate 25 milliGRAM(s) Oral two times a day  ondansetron Injectable 4 milliGRAM(s) IV Push every 4 hours PRN  oxyCODONE    IR 10 milliGRAM(s) Oral every 4 hours PRN  oxyCODONE    IR 5 milliGRAM(s) Oral every 4 hours PRN  pantoprazole    Tablet 40 milliGRAM(s) Oral daily  sodium chloride 0.9% lock flush 3 milliLiter(s) IV Push every 8 hours  torsemide 20 milliGRAM(s) Oral daily      MEDICATIONS  (PRN):  acetaminophen     Tablet .. 650 milliGRAM(s) Oral every 6 hours PRN Mild Pain (1 - 3)  dextrose Oral Gel 15 Gram(s) Oral once PRN Blood Glucose LESS THAN 70 milliGRAM(s)/deciliter  ondansetron Injectable 4 milliGRAM(s) IV Push every 4 hours PRN Nausea and/or Vomiting  oxyCODONE    IR 10 milliGRAM(s) Oral every 4 hours PRN Severe Pain (7 - 10)  oxyCODONE    IR 5 milliGRAM(s) Oral every 4 hours PRN Moderate Pain (4 - 6)        Daily Review:               10.6   12.05 )-----------( 135      ( 24 Feb 2023 05:34 )             32.0   02-24    139  |  103  |  28.0<H>  ----------------------------<  157<H>  3.4<L>   |  25.0  |  0.79    Ca    8.7      24 Feb 2023 05:34  Mg     2.0     02-24      T(C): 36.8 (02-25-23 @ 00:28), Max: 37.1 (02-24-23 @ 07:47)  HR: 75 (02-25-23 @ 00:28) (72 - 83)  BP: 106/67 (02-25-23 @ 00:28) (106/67 - 151/70)  RR: 16 (02-25-23 @ 00:28) (16 - 18)  SpO2: 98% (02-25-23 @ 00:28) (94% - 98%)      CAPILLARY BLOOD GLUCOSE    POCT Blood Glucose.: 137 mg/dL (24 Feb 2023 21:13)  POCT Blood Glucose.: 143 mg/dL (24 Feb 2023 17:04)  POCT Blood Glucose.: 237 mg/dL (24 Feb 2023 12:03)  POCT Blood Glucose.: 164 mg/dL (24 Feb 2023 08:37)      I&O's Summary    23 Feb 2023 07:01  -  24 Feb 2023 07:00  --------------------------------------------------------  IN: 270 mL / OUT: 1330 mL / NET: -1060 mL    24 Feb 2023 07:01  -  25 Feb 2023 03:04  --------------------------------------------------------  IN: 960 mL / OUT: 1170 mL / NET: -210 mL        Physical Exam    Neuro: A+O x 3, non-focal, speech clear and intact  Pulm: coarse breath sounds bilaterally, no wheezing or rales  CV: RRR, +S1S2  Abd: soft, NT, ND, normoactive bowel sounds  Ext: +DP Pulses b/l, +PT pulses, no edema  Inc: Mediastinal sternal incision C/D/I/stable w/ dressing, right C/D/I  Chest tubes: left, mediastinal chest tubes to WS, no air leak        CXR 2/24/23    < from: Xray Chest 1 View- PORTABLE-Urgent (Xray Chest 1 View- PORTABLE-Urgent .) (02.24.23 @ 10:18) >    ACC: [id number] EXAM:  XR CHEST PORTABLE URGENT 1V   ORDERED BY: LOS LITTLE     ACC: [id number] EXAM:  XR CHEST PORTABLE ROUTINE 1V   ORDERED BY: ANCELMO FARIAS     PROCEDURE DATE:  02/24/2023          INTERPRETATION:  Chest one view 2/24/2023 4:02 AM    HISTORY: Postop    COMPARISON STUDY: 2/23/2023    Frontal expiratory view of the chest shows the heart to be similar in   size. Bilateral chest tubes remain present. Sternal wires are again noted.    The lungs show less bilateral atelectasis with trace left apical   pneumothorax and there is no evidence of right pneumothorax nor pleural   effusion.    Chest one view 2/24/2023 9:35 AM  Compared to the prior study, right chest tube has been removed. No   pneumothorax.      IMPRESSION:  No pneumothorax in final image.        Thank you for the courtesy of this referral.    --- End of Report ---    < end of copied text >      
Brief summary:  77y Male POD# 2 C3L with Gonclaves       SUBJECTIVE:  Pt alert and oriented, Pt states "I feel good" Pt denies palpitations, SOB, nausea, and vomiting    Overnight events:  none       PAST MEDICAL & SURGICAL HISTORY:  HTN (hypertension)      HLD (hyperlipidemia)      Post traumatic stress disorder (PTSD)        MEDICATIONS  (STANDING):  acetaminophen     Tablet .. 975 milliGRAM(s) Oral every 6 hours  aspirin enteric coated 81 milliGRAM(s) Oral daily  atorvastatin 80 milliGRAM(s) Oral at bedtime  cefuroxime  IVPB 1500 milliGRAM(s) IV Intermittent every 8 hours  chlorhexidine 2% Cloths 1 Application(s) Topical two times a day  dextrose 50% Injectable 50 milliLiter(s) IV Push every 15 minutes  dextrose 50% Injectable 25 milliLiter(s) IV Push every 15 minutes  dextrose 50% Injectable 50 milliLiter(s) IV Push every 15 minutes  dextrose 50% Injectable 25 milliLiter(s) IV Push every 15 minutes  DULoxetine 60 milliGRAM(s) Oral daily  enoxaparin Injectable 40 milliGRAM(s) SubCutaneous every 24 hours  insulin lispro Injectable (ADMELOG) 2 Unit(s) SubCutaneous three times a day before meals  insulin regular Infusion 4 Unit(s)/Hr (4 mL/Hr) IV Continuous <Continuous>  lactated ringers Bolus 500 milliLiter(s) IV Bolus once  lidocaine   4% Patch 1 Patch Transdermal daily  metoprolol tartrate 25 milliGRAM(s) Oral two times a day  pantoprazole    Tablet 40 milliGRAM(s) Oral daily  polyethylene glycol 3350 17 Gram(s) Oral daily  senna 2 Tablet(s) Oral at bedtime  sodium chloride 0.9%. 1000 milliLiter(s) (10 mL/Hr) IV Continuous <Continuous>  sodium chloride 0.9%. 1000 milliLiter(s) (5 mL/Hr) IV Continuous <Continuous>  vancomycin  IVPB 1250 milliGRAM(s) IV Intermittent every 12 hours                                8.5    10.72 )-----------( 151      ( 22 Feb 2023 02:10 )             25.2   02-22    140  |  105  |  15.3  ----------------------------<  112<H>  3.9   |  23.0  |  0.81    Ca    8.6      22 Feb 2023 02:10  Mg     1.9     02-22    TPro  5.7<L>  /  Alb  3.9  /  TBili  0.5  /  DBili  x   /  AST  42<H>  /  ALT  13  /  AlkPhos  53  02-22    ICU Vital Signs Last 24 Hrs  T(C): 36.5 (23 Feb 2023 00:00), Max: 37.8 (22 Feb 2023 16:00)  T(F): 97.7 (23 Feb 2023 00:00), Max: 100 (22 Feb 2023 16:00)  HR: 72 (23 Feb 2023 01:00) (69 - 93)  BP: 118/65 (23 Feb 2023 01:00) (109/62 - 132/75)  BP(mean): 87 (23 Feb 2023 01:00) (81 - 98)  ABP: 140/51 (23 Feb 2023 01:00) (106/49 - 168/62)  ABP(mean): 79 (23 Feb 2023 01:00) (69 - 96)  RR: 22 (23 Feb 2023 01:00) (18 - 22)  SpO2: 91% (23 Feb 2023 01:00) (91% - 100%)    O2 Parameters below as of 23 Feb 2023 01:00  Patient On (Oxygen Delivery Method): room air        I&O's Summary    21 Feb 2023 07:01  -  22 Feb 2023 07:00  --------------------------------------------------------  IN: 3674.4 mL / OUT: 3975 mL / NET: -300.6 mL    22 Feb 2023 07:01  -  23 Feb 2023 02:21  --------------------------------------------------------  IN: 2469 mL / OUT: 1415 mL / NET: 1054 mL        Physical Exam  Neuro: A+O x 3, non-focal, speech clear and intact  Pulm: CTA, good air entry, equal bilaterally, no rales/rhonchi/wheezing, no accessory muscle use noted  CV: regular rate, regular rhythm, +S1S2, no murmur or rub noted  Abd: soft, NT, ND,  Ext: GUTIERREZ x 4, no edema, no cyanosis or clubbing, R groin IABP, 2+ DP b/l, distal motor/neuro/circ intact.  Inc: MSI C/D/I/stable w/ dressing, RVH C/D/I with Ace wrap  Chest tubes: Left, RIght, PB CT   PW 50/10/0.8 AV wires

## 2023-02-26 NOTE — PROGRESS NOTE ADULT - ASSESSMENT
77y Male PMHx of HTN, HLD, DM presented to ED with c/o L arm numbness, abnormal movement, code stroke called in ED, CT head neg for acute infarct. Labs significant for elevated troponin 1.5. Pt endorses slight constant chest pressure. States over past month have noticed the pressure more when walking dog. EKG concerning for anterior infarct. Pt given plavix 300mg given and asa, pt now s/p LHC and found to have Severe LM 95 %, LAD 90 %, pCir 70 %, pRCA 80 % mid right 98%, distal right 100% RPDA 95 % Ramus 80 %, Right jennifer IABP placed in the cath lab and patient started on Heparin gtt. Pt remains hemodynamically stable in CT ICU. Neurology Clearance obtained for cardiac surgery. Pt now s/p C3 L with Dr. Seo on 2/21.  2/24 right CT dc'd. 2/25 pacing wires removed, PB chest tube removed.

## 2023-02-26 NOTE — PROGRESS NOTE ADULT - PROBLEM SELECTOR PROBLEM 4
HLD (hyperlipidemia)
HTN (hypertension)
HLD (hyperlipidemia)
HLD (hyperlipidemia)

## 2023-02-26 NOTE — PROGRESS NOTE ADULT - PROBLEM SELECTOR PLAN 5
AC/HS ISS   metformin and alogliptin held in the pre-op setting   follow Insulin protocol   a1c 7.2  CC diet  Endocrine consult appreciated   Cont Lantus, premeal, and ISS coverage
AC/HS ISS   metformin and alogliptin held in the pre-op setting   a1c 7.2  CC diet when not NPO   consider endo consult if hyperglycemic
AC/HS ISS   metformin and alogliptin held in the pre-op setting   follow Insulin protocol   a1c 7.2  CC diet  Endocrine consult appreciated   Cont Lantus, premeal, and ISS coverage
.  - statin
AC/HS ISS   metformin and alogliptin held in the pre-op setting   follow Insulin protocol   a1c 7.2  CC diet  consider endo consult if hyperglycemic    Plan to be discussed with Dr. Seo and CTS team during AM rounds.
AC/HS ISS   metformin and alogliptin held in the pre-op setting   follow Insulin protocol   a1c 7.2  CC diet when not NPO   consider endo consult if hyperglycemic    Plan to be discussed with Dr. Seo and CTS team during AM rounds.
AC/HS ISS   metformin and alogliptin held in the pre-op setting   follow Insulin protocol   a1c 7.2  CC diet  Endocrine consult appreciated   Cont TUCKER Powell coverage.  Standing admelog dc'd.  Metformin 1000 mg bid, tradjenta 5 mg qd started.

## 2023-02-26 NOTE — DISCHARGE NOTE NURSING/CASE MANAGEMENT/SOCIAL WORK - NSDCFUADDAPPT_GEN_ALL_CORE_FT
Patient is on AI List.  You have a prescheduled appointment with your PCP, Dr. Alarcon, on 3/2/23 at 1:00pm. Please call [phone] if you need to reschedule.

## 2023-02-27 VITALS
HEART RATE: 72 BPM | SYSTOLIC BLOOD PRESSURE: 147 MMHG | TEMPERATURE: 98 F | DIASTOLIC BLOOD PRESSURE: 77 MMHG | RESPIRATION RATE: 19 BRPM | OXYGEN SATURATION: 97 %

## 2023-02-27 PROBLEM — F43.10 POST-TRAUMATIC STRESS DISORDER, UNSPECIFIED: Chronic | Status: ACTIVE | Noted: 2023-02-20

## 2023-02-27 PROBLEM — I10 ESSENTIAL (PRIMARY) HYPERTENSION: Chronic | Status: ACTIVE | Noted: 2023-02-20

## 2023-02-27 PROBLEM — E78.5 HYPERLIPIDEMIA, UNSPECIFIED: Chronic | Status: ACTIVE | Noted: 2023-02-20

## 2023-02-27 LAB
ANION GAP SERPL CALC-SCNC: 16 MMOL/L — SIGNIFICANT CHANGE UP (ref 5–17)
BUN SERPL-MCNC: 28.6 MG/DL — HIGH (ref 8–20)
CALCIUM SERPL-MCNC: 9.1 MG/DL — SIGNIFICANT CHANGE UP (ref 8.4–10.5)
CHLORIDE SERPL-SCNC: 101 MMOL/L — SIGNIFICANT CHANGE UP (ref 96–108)
CO2 SERPL-SCNC: 23 MMOL/L — SIGNIFICANT CHANGE UP (ref 22–29)
CREAT SERPL-MCNC: 0.83 MG/DL — SIGNIFICANT CHANGE UP (ref 0.5–1.3)
EGFR: 90 ML/MIN/1.73M2 — SIGNIFICANT CHANGE UP
GLUCOSE BLDC GLUCOMTR-MCNC: 144 MG/DL — HIGH (ref 70–99)
GLUCOSE BLDC GLUCOMTR-MCNC: 186 MG/DL — HIGH (ref 70–99)
GLUCOSE SERPL-MCNC: 141 MG/DL — HIGH (ref 70–99)
HCT VFR BLD CALC: 36.4 % — LOW (ref 39–50)
HGB BLD-MCNC: 12.3 G/DL — LOW (ref 13–17)
MAGNESIUM SERPL-MCNC: 1.8 MG/DL — SIGNIFICANT CHANGE UP (ref 1.6–2.6)
MCHC RBC-ENTMCNC: 30.1 PG — SIGNIFICANT CHANGE UP (ref 27–34)
MCHC RBC-ENTMCNC: 33.8 GM/DL — SIGNIFICANT CHANGE UP (ref 32–36)
MCV RBC AUTO: 89.2 FL — SIGNIFICANT CHANGE UP (ref 80–100)
PLATELET # BLD AUTO: 220 K/UL — SIGNIFICANT CHANGE UP (ref 150–400)
POTASSIUM SERPL-MCNC: 3.8 MMOL/L — SIGNIFICANT CHANGE UP (ref 3.5–5.3)
POTASSIUM SERPL-SCNC: 3.8 MMOL/L — SIGNIFICANT CHANGE UP (ref 3.5–5.3)
RBC # BLD: 4.08 M/UL — LOW (ref 4.2–5.8)
RBC # FLD: 12.9 % — SIGNIFICANT CHANGE UP (ref 10.3–14.5)
SODIUM SERPL-SCNC: 139 MMOL/L — SIGNIFICANT CHANGE UP (ref 135–145)
WBC # BLD: 10.82 K/UL — HIGH (ref 3.8–10.5)
WBC # FLD AUTO: 10.82 K/UL — HIGH (ref 3.8–10.5)

## 2023-02-27 PROCEDURE — 83880 ASSAY OF NATRIURETIC PEPTIDE: CPT

## 2023-02-27 PROCEDURE — C1887: CPT

## 2023-02-27 PROCEDURE — 93880 EXTRACRANIAL BILAT STUDY: CPT

## 2023-02-27 PROCEDURE — 93312 ECHO TRANSESOPHAGEAL: CPT

## 2023-02-27 PROCEDURE — 80053 COMPREHEN METABOLIC PANEL: CPT

## 2023-02-27 PROCEDURE — 84132 ASSAY OF SERUM POTASSIUM: CPT

## 2023-02-27 PROCEDURE — C8929: CPT

## 2023-02-27 PROCEDURE — 96374 THER/PROPH/DIAG INJ IV PUSH: CPT

## 2023-02-27 PROCEDURE — 82553 CREATINE MB FRACTION: CPT

## 2023-02-27 PROCEDURE — 71045 X-RAY EXAM CHEST 1 VIEW: CPT

## 2023-02-27 PROCEDURE — 93325 DOPPLER ECHO COLOR FLOW MAPG: CPT

## 2023-02-27 PROCEDURE — 83036 HEMOGLOBIN GLYCOSYLATED A1C: CPT

## 2023-02-27 PROCEDURE — 85025 COMPLETE CBC W/AUTO DIFF WBC: CPT

## 2023-02-27 PROCEDURE — U0003: CPT

## 2023-02-27 PROCEDURE — 82330 ASSAY OF CALCIUM: CPT

## 2023-02-27 PROCEDURE — 93320 DOPPLER ECHO COMPLETE: CPT

## 2023-02-27 PROCEDURE — 85014 HEMATOCRIT: CPT

## 2023-02-27 PROCEDURE — 83735 ASSAY OF MAGNESIUM: CPT

## 2023-02-27 PROCEDURE — 0042T: CPT | Mod: MA

## 2023-02-27 PROCEDURE — C1769: CPT

## 2023-02-27 PROCEDURE — 81001 URINALYSIS AUTO W/SCOPE: CPT

## 2023-02-27 PROCEDURE — 94003 VENT MGMT INPAT SUBQ DAY: CPT

## 2023-02-27 PROCEDURE — 93005 ELECTROCARDIOGRAM TRACING: CPT

## 2023-02-27 PROCEDURE — 93458 L HRT ARTERY/VENTRICLE ANGIO: CPT

## 2023-02-27 PROCEDURE — 94010 BREATHING CAPACITY TEST: CPT

## 2023-02-27 PROCEDURE — 80061 LIPID PANEL: CPT

## 2023-02-27 PROCEDURE — P9045: CPT

## 2023-02-27 PROCEDURE — 83605 ASSAY OF LACTIC ACID: CPT

## 2023-02-27 PROCEDURE — 70450 CT HEAD/BRAIN W/O DYE: CPT | Mod: MA

## 2023-02-27 PROCEDURE — 85018 HEMOGLOBIN: CPT

## 2023-02-27 PROCEDURE — 82947 ASSAY GLUCOSE BLOOD QUANT: CPT

## 2023-02-27 PROCEDURE — C1894: CPT

## 2023-02-27 PROCEDURE — 86901 BLOOD TYPING SEROLOGIC RH(D): CPT

## 2023-02-27 PROCEDURE — 84295 ASSAY OF SERUM SODIUM: CPT

## 2023-02-27 PROCEDURE — 84484 ASSAY OF TROPONIN QUANT: CPT

## 2023-02-27 PROCEDURE — 86923 COMPATIBILITY TEST ELECTRIC: CPT

## 2023-02-27 PROCEDURE — 94760 N-INVAS EAR/PLS OXIMETRY 1: CPT

## 2023-02-27 PROCEDURE — 86803 HEPATITIS C AB TEST: CPT

## 2023-02-27 PROCEDURE — C1751: CPT

## 2023-02-27 PROCEDURE — 82550 ASSAY OF CK (CPK): CPT

## 2023-02-27 PROCEDURE — 87641 MR-STAPH DNA AMP PROBE: CPT

## 2023-02-27 PROCEDURE — 0225U NFCT DS DNA&RNA 21 SARSCOV2: CPT

## 2023-02-27 PROCEDURE — 82962 GLUCOSE BLOOD TEST: CPT

## 2023-02-27 PROCEDURE — P9037: CPT

## 2023-02-27 PROCEDURE — 87640 STAPH A DNA AMP PROBE: CPT

## 2023-02-27 PROCEDURE — 85576 BLOOD PLATELET AGGREGATION: CPT

## 2023-02-27 PROCEDURE — 87086 URINE CULTURE/COLONY COUNT: CPT

## 2023-02-27 PROCEDURE — 70496 CT ANGIOGRAPHY HEAD: CPT | Mod: MA

## 2023-02-27 PROCEDURE — 85610 PROTHROMBIN TIME: CPT

## 2023-02-27 PROCEDURE — 86900 BLOOD TYPING SEROLOGIC ABO: CPT

## 2023-02-27 PROCEDURE — P9016: CPT

## 2023-02-27 PROCEDURE — C1889: CPT

## 2023-02-27 PROCEDURE — 70498 CT ANGIOGRAPHY NECK: CPT | Mod: MA

## 2023-02-27 PROCEDURE — P9100: CPT

## 2023-02-27 PROCEDURE — 71045 X-RAY EXAM CHEST 1 VIEW: CPT | Mod: 26

## 2023-02-27 PROCEDURE — 80048 BASIC METABOLIC PNL TOTAL CA: CPT

## 2023-02-27 PROCEDURE — 97163 PT EVAL HIGH COMPLEX 45 MIN: CPT

## 2023-02-27 PROCEDURE — 84134 ASSAY OF PREALBUMIN: CPT

## 2023-02-27 PROCEDURE — P9012: CPT

## 2023-02-27 PROCEDURE — 86850 RBC ANTIBODY SCREEN: CPT

## 2023-02-27 PROCEDURE — 85027 COMPLETE CBC AUTOMATED: CPT

## 2023-02-27 PROCEDURE — 84480 ASSAY TRIIODOTHYRONINE (T3): CPT

## 2023-02-27 PROCEDURE — 99285 EMERGENCY DEPT VISIT HI MDM: CPT

## 2023-02-27 PROCEDURE — 82435 ASSAY OF BLOOD CHLORIDE: CPT

## 2023-02-27 PROCEDURE — 36430 TRANSFUSION BLD/BLD COMPNT: CPT

## 2023-02-27 PROCEDURE — 86965 POOLING BLOOD PLATELETS: CPT

## 2023-02-27 PROCEDURE — U0005: CPT

## 2023-02-27 PROCEDURE — 84443 ASSAY THYROID STIM HORMONE: CPT

## 2023-02-27 PROCEDURE — 84436 ASSAY OF TOTAL THYROXINE: CPT

## 2023-02-27 PROCEDURE — 36415 COLL VENOUS BLD VENIPUNCTURE: CPT

## 2023-02-27 PROCEDURE — 85730 THROMBOPLASTIN TIME PARTIAL: CPT

## 2023-02-27 PROCEDURE — 33967 INSERT I-AORT PERCUT DEVICE: CPT

## 2023-02-27 PROCEDURE — 82803 BLOOD GASES ANY COMBINATION: CPT

## 2023-02-27 RX ORDER — METOPROLOL TARTRATE 50 MG
1 TABLET ORAL
Qty: 60 | Refills: 1
Start: 2023-02-27 | End: 2023-04-27

## 2023-02-27 RX ORDER — POTASSIUM CHLORIDE 20 MEQ
1 PACKET (EA) ORAL
Qty: 7 | Refills: 0
Start: 2023-02-27 | End: 2023-03-05

## 2023-02-27 RX ORDER — MAGNESIUM SULFATE 500 MG/ML
2 VIAL (ML) INJECTION ONCE
Refills: 0 | Status: COMPLETED | OUTPATIENT
Start: 2023-02-27 | End: 2023-02-27

## 2023-02-27 RX ORDER — ACETAMINOPHEN 500 MG
2 TABLET ORAL
Qty: 0 | Refills: 0 | DISCHARGE
Start: 2023-02-27

## 2023-02-27 RX ORDER — ATORVASTATIN CALCIUM 80 MG/1
1 TABLET, FILM COATED ORAL
Qty: 30 | Refills: 1
Start: 2023-02-27 | End: 2023-04-27

## 2023-02-27 RX ORDER — POTASSIUM CHLORIDE 20 MEQ
40 PACKET (EA) ORAL ONCE
Refills: 0 | Status: COMPLETED | OUTPATIENT
Start: 2023-02-27 | End: 2023-02-27

## 2023-02-27 RX ORDER — LISINOPRIL 2.5 MG/1
1 TABLET ORAL
Qty: 0 | Refills: 0 | DISCHARGE

## 2023-02-27 RX ORDER — ASPIRIN/CALCIUM CARB/MAGNESIUM 324 MG
1 TABLET ORAL
Qty: 30 | Refills: 1
Start: 2023-02-27 | End: 2023-04-27

## 2023-02-27 RX ORDER — OXYCODONE HYDROCHLORIDE 5 MG/1
1 TABLET ORAL
Qty: 28 | Refills: 0
Start: 2023-02-27 | End: 2023-03-05

## 2023-02-27 RX ADMIN — METFORMIN HYDROCHLORIDE 1000 MILLIGRAM(S): 850 TABLET ORAL at 06:16

## 2023-02-27 RX ADMIN — Medication 2: at 12:48

## 2023-02-27 RX ADMIN — Medication 40 MILLIEQUIVALENT(S): at 09:20

## 2023-02-27 RX ADMIN — Medication 25 GRAM(S): at 09:20

## 2023-02-27 RX ADMIN — Medication 10 MILLIGRAM(S): at 06:16

## 2023-02-27 RX ADMIN — DULOXETINE HYDROCHLORIDE 60 MILLIGRAM(S): 30 CAPSULE, DELAYED RELEASE ORAL at 09:20

## 2023-02-27 RX ADMIN — SODIUM CHLORIDE 3 MILLILITER(S): 9 INJECTION INTRAMUSCULAR; INTRAVENOUS; SUBCUTANEOUS at 05:38

## 2023-02-27 RX ADMIN — PANTOPRAZOLE SODIUM 40 MILLIGRAM(S): 20 TABLET, DELAYED RELEASE ORAL at 09:21

## 2023-02-27 RX ADMIN — Medication 81 MILLIGRAM(S): at 09:20

## 2023-02-27 RX ADMIN — Medication 25 MILLIGRAM(S): at 06:16

## 2023-02-27 RX ADMIN — LINAGLIPTIN 5 MILLIGRAM(S): 5 TABLET, FILM COATED ORAL at 09:58

## 2023-02-28 ENCOUNTER — NON-APPOINTMENT (OUTPATIENT)
Age: 78
End: 2023-02-28

## 2023-02-28 RX ORDER — METFORMIN HYDROCHLORIDE 1000 MG/1
1000 TABLET, COATED ORAL
Refills: 0 | Status: ACTIVE | COMMUNITY

## 2023-02-28 RX ORDER — METOPROLOL TARTRATE 25 MG/1
25 TABLET, FILM COATED ORAL TWICE DAILY
Qty: 60 | Refills: 0 | Status: ACTIVE | COMMUNITY

## 2023-02-28 RX ORDER — ALOGLIPTIN 12.5 MG/1
12.5 TABLET, FILM COATED ORAL
Refills: 0 | Status: ACTIVE | COMMUNITY

## 2023-02-28 RX ORDER — DULOXETINE HYDROCHLORIDE 60 MG/1
60 CAPSULE, DELAYED RELEASE PELLETS ORAL
Refills: 0 | Status: ACTIVE | COMMUNITY

## 2023-02-28 RX ORDER — ATORVASTATIN CALCIUM 40 MG/1
40 TABLET, FILM COATED ORAL
Refills: 0 | Status: ACTIVE | COMMUNITY

## 2023-02-28 RX ORDER — OXYCODONE 5 MG/1
5 TABLET ORAL
Refills: 0 | Status: ACTIVE | COMMUNITY

## 2023-02-28 RX ORDER — ASPIRIN ENTERIC COATED TABLETS 81 MG 81 MG/1
81 TABLET, DELAYED RELEASE ORAL
Refills: 0 | Status: ACTIVE | COMMUNITY

## 2023-03-02 ENCOUNTER — APPOINTMENT (OUTPATIENT)
Dept: CARE COORDINATION | Facility: HOME HEALTH | Age: 78
End: 2023-03-02
Payer: COMMERCIAL

## 2023-03-02 VITALS
SYSTOLIC BLOOD PRESSURE: 122 MMHG | RESPIRATION RATE: 16 BRPM | WEIGHT: 168.8 LBS | HEIGHT: 73 IN | OXYGEN SATURATION: 95 % | BODY MASS INDEX: 22.37 KG/M2 | HEART RATE: 65 BPM | DIASTOLIC BLOOD PRESSURE: 72 MMHG

## 2023-03-02 PROCEDURE — 99024 POSTOP FOLLOW-UP VISIT: CPT

## 2023-03-02 NOTE — REASON FOR VISIT
[Follow-Up: _____] : a [unfilled] follow-up visit [FreeTextEntry1] : FOLLOW YOUR HEART- Transitional Care Management Program- [facility]\par \par

## 2023-03-02 NOTE — PHYSICAL EXAM
[Sclera] : the sclera and conjunctiva were normal [Neck Appearance] : the appearance of the neck was normal [Respiration, Rhythm And Depth] : normal respiratory rhythm and effort [Exaggerated Use Of Accessory Muscles For Inspiration] : no accessory muscle use [Auscultation Breath Sounds / Voice Sounds] : lungs were clear to auscultation bilaterally [Apical Impulse] : the apical impulse was normal [Heart Rate And Rhythm] : heart rate was normal and rhythm regular [Heart Sounds] : normal S1 and S2 [Murmurs] : no murmurs [Chest Visual Inspection Thoracic Asymmetry] : no chest asymmetry [1+] : left 1+ [Bowel Sounds] : normal bowel sounds [Abdomen Soft] : soft [Abdomen Tenderness] : non-tender [Abdomen Mass (___ Cm)] : no abdominal mass palpated [Abnormal Walk] : normal gait [Skin Color & Pigmentation] : normal skin color and pigmentation [Skin Turgor] : normal skin turgor [] : no rash [Oriented To Time, Place, And Person] : oriented to person, place, and time [Impaired Insight] : insight and judgment were intact [Affect] : the affect was normal [FreeTextEntry2] : B/L LE without edema, B/L calves soft, NT [FreeTextEntry1] : Right SVG harvest site without erythema, warmth or drainage. Edges well approximated.

## 2023-03-02 NOTE — ASSESSMENT
[FreeTextEntry1] : Pt recovering well at home s/p OHS. Reviewed all medications and dosages with pt understanding. Pt has all medications in home and is taking as prescribed. Pain controlled with current medication regimen. No new symptoms, issues or concerns to report at this time. Pt has declined home care services. Supportive family at home. Agrees to call FY team with any issues or concerns. \par

## 2023-03-02 NOTE — HISTORY OF PRESENT ILLNESS
[FreeTextEntry1] : 77y Male PMHx of HTN, HLD, DM presented to ED with c/o L arm numbness, abnormal \par movement, code stroke called in ED, CT head neg for acute infarct. Labs \par significant for elevated troponin 1.5. Pt endorsed slight constant chest \par pressure. States over past month have noticed the pressure more when walking \par dog. EKG concerning for anterior infarct. Pt given plavix 300mg given and asa, \par pt now s/p C and found to have Severe LM 95 %, LAD 90 %, pCir 70 %, pRCA 80 % \par mid right 98%, distal right 100% RPDA 95 % Ramus 80 %, Right groin IABP placed \par in the cath lab and patient started on Heparin gtt. Pt remained hemodynamically \par stable in CT ICU. Neurology Clearance obtained for cardiac surgery. Pt now s/p \par C3 L with Dr. Seo on 2/21.  Pt remained hemodynamically stable and discharged home with support of spouse/family, home care services and the [facility] team. Initial visit completed in home.\par CC "I'm doing great"\par

## 2023-03-07 ENCOUNTER — NON-APPOINTMENT (OUTPATIENT)
Age: 78
End: 2023-03-07

## 2023-03-08 ENCOUNTER — NON-APPOINTMENT (OUTPATIENT)
Age: 78
End: 2023-03-08

## 2023-03-10 ENCOUNTER — RESULT REVIEW (OUTPATIENT)
Age: 78
End: 2023-03-10

## 2023-03-10 ENCOUNTER — APPOINTMENT (OUTPATIENT)
Dept: CARDIOTHORACIC SURGERY | Facility: CLINIC | Age: 78
End: 2023-03-10
Payer: COMMERCIAL

## 2023-03-10 ENCOUNTER — OUTPATIENT (OUTPATIENT)
Dept: OUTPATIENT SERVICES | Facility: HOSPITAL | Age: 78
LOS: 1 days | End: 2023-03-10
Payer: COMMERCIAL

## 2023-03-10 VITALS
SYSTOLIC BLOOD PRESSURE: 124 MMHG | DIASTOLIC BLOOD PRESSURE: 79 MMHG | HEIGHT: 73 IN | BODY MASS INDEX: 22 KG/M2 | HEART RATE: 71 BPM | WEIGHT: 166 LBS | RESPIRATION RATE: 16 BRPM | OXYGEN SATURATION: 97 %

## 2023-03-10 DIAGNOSIS — Z78.9 OTHER SPECIFIED HEALTH STATUS: ICD-10-CM

## 2023-03-10 DIAGNOSIS — E11.9 TYPE 2 DIABETES MELLITUS W/OUT COMPLICATIONS: ICD-10-CM

## 2023-03-10 DIAGNOSIS — Z95.1 PRESENCE OF AORTOCORONARY BYPASS GRAFT: ICD-10-CM

## 2023-03-10 DIAGNOSIS — J90 PLEURAL EFFUSION, NOT ELSEWHERE CLASSIFIED: ICD-10-CM

## 2023-03-10 DIAGNOSIS — Z82.49 FAMILY HISTORY OF ISCHEMIC HEART DISEASE AND OTHER DISEASES OF THE CIRCULATORY SYSTEM: ICD-10-CM

## 2023-03-10 DIAGNOSIS — Z77.098 CONTACT WITH AND (SUSPECTED) EXPOSURE TO OTHER HAZARDOUS, CHIEFLY NONMEDICINAL, CHEMICALS: ICD-10-CM

## 2023-03-10 DIAGNOSIS — F12.91 CANNABIS USE, UNSPECIFIED, IN REMISSION: ICD-10-CM

## 2023-03-10 PROCEDURE — 99024 POSTOP FOLLOW-UP VISIT: CPT

## 2023-03-10 PROCEDURE — 71046 X-RAY EXAM CHEST 2 VIEWS: CPT

## 2023-03-10 PROCEDURE — 71046 X-RAY EXAM CHEST 2 VIEWS: CPT | Mod: 26

## 2023-03-10 NOTE — COUNSELING
[Hygeine (Including Daily Shower)] : hygeine (including daily shower) [Importance of Regular Medical Follow-Up] : the importance of regular medical follow-up [No Heavy Lifting] : no heavy lifting (>15-20 lb. for 1 month or 25 lb. for 3 months from date of surgery) [Blood Pressure Control] : blood pressure control [Weight Management] : weight management [S/S of infection] : signs and symptoms of infection (and to whom it should be reported) [Progressive Ambulation/Activity] : progressive ambulation/activity [Medication/Vitamin/Herb/Food Interaction] : medication/vitamin/herb/food interaction [Low Fat/Low Cholesterol Diet] : low fat/low cholesterol diet [Blood Sugar Control] : blood sugar control [Stress Management] : stress management

## 2023-03-10 NOTE — ASSESSMENT
[FreeTextEntry1] : Today on exam patient's lungs clear bilaterally, sternum stable, incision clean, dry and intact. SVG site is clean, dry and intact. No peripheral edema noted. Instructed patient on importance of optimal glycemic control, daily showering, daily weights, incentive spirometer use, and increase ambulation as tolerated. Instructed to call office with any signs or symptoms of infection or weight gain of 2 or more pounds in 1 day or 3 or more pounds in 1 week. He has Cardiology follow up today for the first visit. \par \par PLAN:\par - Continue Cardiology care\par - Return to care as needed\par \par \par \par \par \par \par

## 2023-03-29 ENCOUNTER — TRANSCRIPTION ENCOUNTER (OUTPATIENT)
Age: 78
End: 2023-03-29

## 2023-11-16 NOTE — CONSULT NOTE ADULT - SUBJECTIVE AND OBJECTIVE BOX
[facility]                                        [facility] [place]                                  Talib George & Akhil                                      [address]                                           [place], [place], [address]                                                [phone]        CC: left wrist drop.    HISTORY:  The patient is a 77y Male who presents with chest pain and was found to have elevated troponin.   He also noted left hand and arm weakness around 8 pm the night prior to arrival. This has persisted. There is mild numbness as well. He reports that he has difficulty lifting fingers and opening hand.   He has prior war injury to left hand with loss of D4.    PAST MEDICAL & SURGICAL HISTORY:  HTN (hypertension)  HLD (hyperlipidemia)  Post traumatic stress disorder (PTSD)    MEDICATION PRIOR TO ADMISSION:  Does not recall names.     Allergies  No Known Allergies    SOCIAL HISTORY:  Non smoker.     FAMILY HISTORY:  No known family history of stroke.     ROS:  Constitutional: The patient denies fevers or weight changes.  Neuro: As per HPI.  Eyes: Denies blurry vision.  Ears/nose/throat: Denies Tinnitus.   Cardiac: Denies chest pain. Denies palpitations.  Respiratory: Denies shortness of breath.  GI: Denies abdominal pain, nausea, or vomiting.  : Denies change in urinary pattern.  Integumentary: Denies rash.  Psych: Denies recent mood changes.  Heme: denies easy bleeding/bruising.    Exam:  Vital Signs Last 24 Hrs  T(C): 36.7 (20 Feb 2023 12:14), Max: 36.7 (20 Feb 2023 12:14)  T(F): 98.1 (20 Feb 2023 12:14), Max: 98.1 (20 Feb 2023 12:14)  HR: 73 (20 Feb 2023 12:14) (73 - 73)  BP: 115/69 (20 Feb 2023 12:14) (115/69 - 115/69)  RR: 20 (20 Feb 2023 12:14) (20 - 20)  SpO2: 98% (20 Feb 2023 12:14) (98% - 98%)    Parameters below as of 20 Feb 2023 12:14  Patient On (Oxygen Delivery Method): room air    General: NAD.   Carotid bruits absent.     Mental status: The patient is awake, alert, and fully oriented. There is no aphasia. Attention span is normal. Patient is aware of current events.     Cranial nerves: There is no papilledema. Pupils react symmetrically to light. There is no visual field deficit to confrontation. Extraocular motion is full with no nystagmus.  Facial sensation is intact. Facial musculature is symmetric. Palate elevates symmetrically. Tongue is midline.    Motor: There is normal bulk and tone.  Strength is 5/5 in the right arm and leg and in the left leg.   Strength is 5/5 in the left deltoid and bicep, wrist flexion, and finger flexion. There is 4/5 strength in triceps, wrist extension, and finger extension. The left hand is status post injury with loss of D4.    Sensation: Intact to light touch and pin. There is no extinction to double simultaneous stimulation.    Reflexes: 1+ throughout and plantar responses are flexor.    Cerebellar: There is no dysmetria on finger to nose testing.    LABS:                         14.1   10.27 )-----------( 166      ( 20 Feb 2023 12:27 )             42.7       02-20    136  |  98  |  21.4<H>  ----------------------------<  246<H>  4.3   |  25.0  |  1.08    Ca    9.6      20 Feb 2023 12:27    TPro  6.8  /  Alb  4.1  /  TBili  0.3<L>  /  DBili  x   /  AST  56<H>  /  ALT  24  /  AlkPhos  77  02-20      PT/INR - ( 20 Feb 2023 12:27 )   PT: 11.8 sec;   INR: 1.02 ratio    PTT - ( 20 Feb 2023 12:27 )  PTT:29.2 sec    RADIOLOGY   CT head images reviewed (and concur with report): There is no acute pathology.   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     CT-A neck negative for stenosis.   CT-A head negative for large vessel occlusion.    CT-P no core/penumbra.    Yes

## 2025-04-17 NOTE — PATIENT PROFILE ADULT - FUNCTIONAL ASSESSMENT - BASIC MOBILITY 5.
Mona Weinstein stopped by the office to let us know that we need to complete a prior authorization for new Cozaar prescription ([facility] in [facility]).   4 = No assist / stand by assistance

## (undated) DEVICE — GLV 6.5 PROTEXIS (BLUE)

## (undated) DEVICE — DRAPE TOWEL BLUE 17" X 24"

## (undated) DEVICE — CATH IV SAFE BC 24G X 0.75" (YELLOW)

## (undated) DEVICE — SUT PLEDGET 9MM X 4MM X 1.5MM

## (undated) DEVICE — SUT VICRYL 0 36" CTX UNDYED

## (undated) DEVICE — DRSG KLING 4"

## (undated) DEVICE — CHEST DRAIN PLEUR-EVAC DRY/WET ADULT-PEDS SINGLE (QUICK)

## (undated) DEVICE — MARKING PEN W RULER

## (undated) DEVICE — SUT ETHIBOND 2-0 4-30" RB-1 GREEN

## (undated) DEVICE — SUT VICRYL 0 36" CT-1 UNDYED

## (undated) DEVICE — SUT VICRYL 2 27" TP-1 UNDYED

## (undated) DEVICE — TUBING IV SET SECOND 34" W/O LOK-BLUNT

## (undated) DEVICE — DRAPE TOWEL WHITE 17" X 24"

## (undated) DEVICE — SOL IRR POUR NS 0.9% 500ML

## (undated) DEVICE — BLADE SURGICAL #11 CARBON

## (undated) DEVICE — TEMP PROBE 30MM MYOCARDIAL

## (undated) DEVICE — DRSG OPSITE 2.5 X 2"

## (undated) DEVICE — MEDTRONIC URCHIN EVO HEART POSITIONER & CANISTER TUBING SET

## (undated) DEVICE — WARMING BLANKET DUO-THERM HYPER/HYPOTHERM ADULT

## (undated) DEVICE — SAW BLADE STRYKER SAGITTAL SAFEDGE 40.5X47.5X0.64

## (undated) DEVICE — PACK OPEN HEART VAMP PLUS

## (undated) DEVICE — SUT PROLENE 6-0 30" C-1

## (undated) DEVICE — DRAPE CV 106" X 135"

## (undated) DEVICE — SUT VICRYL 3-0 27" CT-1

## (undated) DEVICE — TUBING INSUFFLATION LAP FILTER 10FT

## (undated) DEVICE — SUT MONOCRYL 3-0 27" PS-2 UNDYED

## (undated) DEVICE — SUT VICRYL 2-0 27" CT-1 UNDYED

## (undated) DEVICE — SENSOR MYOCARDIAL TEMP 15MM

## (undated) DEVICE — FOLEY TRAY 16FR 5CC LF LUBRISIL ADVANCE TEMP CLOSED

## (undated) DEVICE — STEALTH CLAMP INSERT FIBRA/FIBRA 60MM

## (undated) DEVICE — SUT ETHIBOND 2 30" V37

## (undated) DEVICE — SUT ETHIBOND 5 4-30" CCS

## (undated) DEVICE — DRAPE SLUSH / WARMER 44 X 66"

## (undated) DEVICE — NDL PERC BASEPLT 18GX7CM

## (undated) DEVICE — CONNECTOR STRAIGHT 3/8 X 1/2"

## (undated) DEVICE — TOURNIQUET SET TOURNIKWIK 12FR (4 TUBES, 1 SNARE) 7.5"

## (undated) DEVICE — TUBING ATS SUCTION LINE

## (undated) DEVICE — SUT ETHIBOND 2-0 30" SH-1 GREEN/WHITE

## (undated) DEVICE — SUT SILK 2-0 18" SH (POP-OFF)

## (undated) DEVICE — SUT ETHIBOND 2-0 30" V5 WHITE

## (undated) DEVICE — SUT ETHIBOND 2-0 36" SH

## (undated) DEVICE — SUT PROLENE 3-0 36" SH

## (undated) DEVICE — SUT SILK 2 30" MH

## (undated) DEVICE — POSITIONER CARDIAC BUMP

## (undated) DEVICE — STOPCOCK 3 WAY W SWIVEL MALE LUER LOCK

## (undated) DEVICE — NDL TAPR FR EYE 1/2 CIR 3

## (undated) DEVICE — SUT VICRYL 0 54" REEL UNDYED

## (undated) DEVICE — SUT PROLENE 4-0 30" SH-1

## (undated) DEVICE — BLADE SURGICAL #15 CARBON

## (undated) DEVICE — SET BLOOD Y-TYPE

## (undated) DEVICE — SUT VICRYL 1 36" CTX UNDYED

## (undated) DEVICE — TUBING SUCTION 20FT

## (undated) DEVICE — DRSG TAPE TRANSPORE 3"

## (undated) DEVICE — SUT PROLENE 7-0 30" BV-1

## (undated) DEVICE — DRSG TAPE UMBILICAL COTTON 2" X 30 X 1/8"

## (undated) DEVICE — NDL BLUNT 18G LOOP VESSEL MAXI WHITE

## (undated) DEVICE — SUT PROLENE 3-0 36" MH

## (undated) DEVICE — FRAZIER SUCTION TIP 10FR

## (undated) DEVICE — PACK VALVE

## (undated) DEVICE — DRSG CURITY GAUZE SPONGE 4 X 4" 12-PLY

## (undated) DEVICE — SYR LUER LOK 20CC

## (undated) DEVICE — SYS VEIN HARVESTING VIRTUOSAPH PLUS W/ RADIAL

## (undated) DEVICE — GLV 8.5 PROTEXIS (WHITE)

## (undated) DEVICE — SUT BLUNT SZ 5

## (undated) DEVICE — SUT MONOCRYL 4-0 27" PS-2 UNDYED

## (undated) DEVICE — SUT PROLENE 7-0 24" BV-1

## (undated) DEVICE — AORTIC PUNCH 4.0MM STANDARD HANDLE

## (undated) DEVICE — VISITEC 4X4

## (undated) DEVICE — TUBING TRUWAVE PRESSURE MALE/FEMALE 72"

## (undated) DEVICE — STAPLER SKIN PROXIMATE

## (undated) DEVICE — ELCTR BOVIE BLADE 3/4" EXTENDED LENGTH 6"

## (undated) DEVICE — SUT STAINLESS STEEL 5 18" SCC

## (undated) DEVICE — PREP CHLORAPREP HI-LITE ORANGE 26ML

## (undated) DEVICE — VESSEL LOOP MAXI-RED  0.120" X 16"

## (undated) DEVICE — CLEANER FOR ELCTR TIP

## (undated) DEVICE — SUT PROLENE 8-0 24" BV175-6

## (undated) DEVICE — DRSG TEGADERM 4X4.75"

## (undated) DEVICE — SUT PROLENE 3-0 36" SH-1

## (undated) DEVICE — GLV 6.5 PROTEXIS (WHITE)

## (undated) DEVICE — Device

## (undated) DEVICE — MAXI-FLO SUCTION CATHETER KIT 14FR STRAIGHT

## (undated) DEVICE — BEAVER BLADE MINI SHARP ALL ROUND (BLUE)

## (undated) DEVICE — SYNOVIS VASCULAR PROBE 1.5MM 15CM

## (undated) DEVICE — SUT PDS II 2-0 27" CT-1

## (undated) DEVICE — AORTIC PUNCH 4.4MM LONG HANDLE

## (undated) DEVICE — GLV 8 PROTEXIS (WHITE)

## (undated) DEVICE — GLV 6 PROTEXIS (WHITE)

## (undated) DEVICE — GLV 7.5 PROTEXIS (WHITE)

## (undated) DEVICE — SUT ETHIBOND 3-0 36" RB-1

## (undated) DEVICE — STEALTH CLAMP INSERT FIBRA/FIBRA 90MM

## (undated) DEVICE — ELCTR BOVIE PENCIL HANDPIECE ROCKER SWITCH 15FT

## (undated) DEVICE — PREP SCRUB BRUSH W CHG 4%

## (undated) DEVICE — NDL HYPO SAFE 18G X 1.5" (PINK)

## (undated) DEVICE — SUT SILK 5-0 60" TIES

## (undated) DEVICE — SUT SILK 0 30" SH

## (undated) DEVICE — MEDTRONIC CLEARVIEW BLOWER MISTER KIT W TUBING SET

## (undated) DEVICE — SUT PROLENE 4-0 36" SH

## (undated) DEVICE — SOL INJ NS 0.9% 500ML 1-PORT

## (undated) DEVICE — SUT DOUBLE 6 WIRE STERNAL

## (undated) DEVICE — BLADE SURGICAL #10 CARBON

## (undated) DEVICE — PHRENIC NERVE PAD MEDIUM

## (undated) DEVICE — WOUND IRR IRRISEPT W 0.5 CHG

## (undated) DEVICE — DRAIN CHANNEL 32FR ROUND HUBLESS FULL FLUTED

## (undated) DEVICE — PACK CARDIOPLEGIA TABLE LINE W WYE

## (undated) DEVICE — GLV 7 PROTEXIS (BLUE)

## (undated) DEVICE — PRESSURE INFUSOR BAG 500ML

## (undated) DEVICE — PACING CABLE (BLUE) ATRIAL TEMP SCREW DOWN 12FT

## (undated) DEVICE — SUT ETHIBOND 2-0 4-30" RB-1 WHITE

## (undated) DEVICE — SUT PROLENE 4-0 36" RB-1

## (undated) DEVICE — SUT ETHIBOND 4-0 36" RB-1

## (undated) DEVICE — DRSG MEPILEX 10 X 10CM (4 X 4") AG

## (undated) DEVICE — SUT BOOT STANDARD (ASSORTED) 5 PAIR

## (undated) DEVICE — SUT SILK 0 30" TIES

## (undated) DEVICE — SUT PROLENE 5-0 30" RB-2

## (undated) DEVICE — SUT PERMAHAND SILK 2 60" TIES

## (undated) DEVICE — SUT PROLENE 7-0 24" BV175-6

## (undated) DEVICE — GETINGE VASOVIEW 7 ENDOSCOPIC VESSEL HARVESTING SYSTEM

## (undated) DEVICE — PACING CABLE A/V TEMP SCREW DOWN 6FT

## (undated) DEVICE — SUT PROLENE 5-0 36" C-1

## (undated) DEVICE — SUCTION YANKAUER NO CONTROL VENT

## (undated) DEVICE — SUT PROLENE 6-0 24" C-1

## (undated) DEVICE — SUT SILK 0 30" KS

## (undated) DEVICE — BULLDOG SPRING CLIP 6MM SOFT/SOFT

## (undated) DEVICE — GLV 7.5 SENSICARE W ALOE

## (undated) DEVICE — LAP PAD 18 X 18"

## (undated) DEVICE — NDL COUNTER FOAM AND MAGNET 40-70

## (undated) DEVICE — VENTING ADAPTER "Y" (RED/BLUE) 7.5"

## (undated) DEVICE — SUT PROLENE 5-0 36" RB-1

## (undated) DEVICE — TONGUE DEPRESSOR

## (undated) DEVICE — TRAY IRRIGATION SYR BULB 60CC

## (undated) DEVICE — DRSG OPSITE 13.75 X 4"

## (undated) DEVICE — STOPCOCK 3 WAY W TUBE 35"

## (undated) DEVICE — GOWN TRIMAX LG

## (undated) DEVICE — STABILIZER HAND ASSISTANT ATTACHMENT W STABLESOFT 2S

## (undated) DEVICE — ELCTR MULTIFUNCTION DEFIBRILLATION ELECTRODE EDGE SYSTEM ADULT

## (undated) DEVICE — SPONGE PEANUT AUTO COUNT

## (undated) DEVICE — DRSG ALLEVYN LIFE SACRUM 6.75 X 6.75 (PINK)

## (undated) DEVICE — DRAPE INSTRUMENT POUCH 6.75" X 11"

## (undated) DEVICE — SOL IRR POUR H2O 500ML

## (undated) DEVICE — SUT SOFSILK 4-0 24" CV-15

## (undated) DEVICE — PACING CABLE BI-V TEMP ALLIGATOR CLIP 12FT

## (undated) DEVICE — SUT HOLDER INSERT FOR OCTOBASE STERNAL RETRACTOR

## (undated) DEVICE — SUT ETHIBOND 1 30" OS6

## (undated) DEVICE — SOL ANTI FOG

## (undated) DEVICE — SOL INJ NS 0.9% 1000ML

## (undated) DEVICE — DRSG MEPILEX 10 X 30CM (4 X 12") AG

## (undated) DEVICE — AORTIC PUNCH 5MM STANDARD HANDLE

## (undated) DEVICE — MULTIPLE PERFUSION SET FEMALE 1 INLET LEG W 4 LEGS 15" (BLUE/RED)

## (undated) DEVICE — SAW BLADE STRYKER STERNUM 31MM X 6.27 X .79

## (undated) DEVICE — GLV 7 PROTEXIS (WHITE)

## (undated) DEVICE — SUT GORETEX CV-4 (3-0) 36" TH-18

## (undated) DEVICE — DRAPE 3/4 SHEET 52X76"

## (undated) DEVICE — TUBING MEDI-VAC W MAXIGRIP CONNECTORS 1/4"X6'

## (undated) DEVICE — MEDICATION LABELS W MARKER

## (undated) DEVICE — TUBING ALARIS PUMP MODULE NON-DEHP

## (undated) DEVICE — SWITCH ARISS TABLE MOUNT UNEQUAL LEGS 13"

## (undated) DEVICE — PRESSURE INFUSOR BAG 1000ML

## (undated) DEVICE — RANGER BLOOD/FLUID WARMING SET DISP

## (undated) DEVICE — DRAPE IOBAN 33" X 23"

## (undated) DEVICE — GOWN XL W TOWEL

## (undated) DEVICE — BLADE SURGICAL #20 CARBON